# Patient Record
Sex: FEMALE | Employment: FULL TIME | URBAN - METROPOLITAN AREA
[De-identification: names, ages, dates, MRNs, and addresses within clinical notes are randomized per-mention and may not be internally consistent; named-entity substitution may affect disease eponyms.]

---

## 2024-03-13 ENCOUNTER — HOSPITAL ENCOUNTER (OUTPATIENT)
Facility: MEDICAL CENTER | Age: 49
End: 2024-03-13
Attending: OBSTETRICS & GYNECOLOGY
Payer: COMMERCIAL

## 2024-03-13 ENCOUNTER — GYNECOLOGY VISIT (OUTPATIENT)
Dept: OBGYN | Facility: CLINIC | Age: 49
End: 2024-03-13
Payer: COMMERCIAL

## 2024-03-13 VITALS — HEIGHT: 61 IN | BODY MASS INDEX: 27.15 KG/M2 | WEIGHT: 143.8 LBS

## 2024-03-13 DIAGNOSIS — R10.2 PELVIC PAIN: ICD-10-CM

## 2024-03-13 DIAGNOSIS — D25.9 UTERINE LEIOMYOMA, UNSPECIFIED LOCATION: ICD-10-CM

## 2024-03-13 DIAGNOSIS — Z01.419 WOMEN'S ANNUAL ROUTINE GYNECOLOGICAL EXAMINATION: ICD-10-CM

## 2024-03-13 DIAGNOSIS — Z12.39 ENCOUNTER FOR SCREENING FOR MALIGNANT NEOPLASM OF BREAST, UNSPECIFIED SCREENING MODALITY: ICD-10-CM

## 2024-03-13 DIAGNOSIS — Z12.11 SCREENING FOR COLON CANCER: ICD-10-CM

## 2024-03-13 DIAGNOSIS — Z12.4 SCREENING FOR CERVICAL CANCER: ICD-10-CM

## 2024-03-13 LAB — AMBIGUOUS DTTM AMBI4: NORMAL

## 2024-03-13 PROCEDURE — 87510 GARDNER VAG DNA DIR PROBE: CPT

## 2024-03-13 PROCEDURE — 87480 CANDIDA DNA DIR PROBE: CPT

## 2024-03-13 PROCEDURE — 87591 N.GONORRHOEAE DNA AMP PROB: CPT

## 2024-03-13 PROCEDURE — 88175 CYTOPATH C/V AUTO FLUID REDO: CPT

## 2024-03-13 PROCEDURE — 87491 CHLMYD TRACH DNA AMP PROBE: CPT

## 2024-03-13 PROCEDURE — 87660 TRICHOMONAS VAGIN DIR PROBE: CPT

## 2024-03-13 PROCEDURE — 87624 HPV HI-RISK TYP POOLED RSLT: CPT

## 2024-03-13 PROCEDURE — 99386 PREV VISIT NEW AGE 40-64: CPT | Performed by: OBSTETRICS & GYNECOLOGY

## 2024-03-13 NOTE — PROGRESS NOTES
ANNUAL GYNECOLOGY VISIT    Chief Complaint  No chief complaint on file.      Subjective  Camila Hills is a 48 y.o. female who presents today for Annual Exam.  She also would like to discuss her fibroid uterus. She had imaging at [facility] and I do not have the results available. She reports that her main symptom is pelvic pain. She states that her cycle has gotten heavier in the last year, but she does not feel like it is excessively heavy. She states that the pain and cramping sometimes interferes with her ability to do her day to day activities.     Preventive Care   There is no immunization history for the selected administration types on file for this patient.  Last Mammogram: 1.5 years ago per patient    Gynecology History and ROS  Current Sexual Activity: Yes  History of sexually transmitted diseases?  no  Abnormal discharge? No  Current Contraception:  None    Menstrual History  No LMP recorded.  Periods are regular  q 30 days, lasting 7 days.   Clots or heavy flow: Yes  Dysmenorrhea: Yes  Intermenstrual bleeding/spotting: No  Significant pain with periods:Yes  Bothersome PMS symptoms: No  Significant Pelvic Pain: Yes    Pap History  Last pap smear: She thinks she had one last year at a PCP. Records are not available   History of moderate or severe dysplasia: No    Cancer Risk Assessement:  Family history of:   - Breast cancer: no   - Ovarian cancer: no   - Uterine cancer: no   - Colon cancer: no    Obstetric History  OB History    Para Term  AB Living   2 2 2     2   SAB IAB Ectopic Molar Multiple Live Births             2      # Outcome Date GA Lbr Black/2nd Weight Sex Delivery Anes PTL Lv   2 Term 00    M Vag-Spont   SHUN   1 Term 10/02/95    F Vag-Spont   SHUN       Past Medical History  Past Medical History:   Diagnosis Date    Anemia     Blood transfusion without reported diagnosis        Past Surgical History  History reviewed. No pertinent surgical  history.    Social History  Social History     Socioeconomic History    Marital status:      Spouse name: Not on file    Number of children: Not on file    Years of education: Not on file    Highest education level: Not on file   Occupational History    Not on file   Tobacco Use    Smoking status: Never    Smokeless tobacco: Never   Vaping Use    Vaping Use: Never used   Substance and Sexual Activity    Alcohol use: Yes     Comment: occ    Drug use: Never    Sexual activity: Yes     Partners: Male   Other Topics Concern    Not on file   Social History Narrative    Not on file     Social Determinants of Health     Financial Resource Strain: Not on file   Food Insecurity: Not on file   Transportation Needs: Not on file   Physical Activity: Not on file   Stress: Not on file   Social Connections: Not on file   Intimate Partner Violence: Not on file   Housing Stability: Not on file       Family History  Family History   Problem Relation Age of Onset    Stroke Brother         Youngest brother       Home Medications  Current Outpatient Medications on File Prior to Visit   Medication Sig Dispense Refill    Chlorphen-PE-Acetaminophen (ROBITUSSIN COLD/CONGESTION PO) Take  by mouth. (Patient not taking: Reported on 3/13/2024)      promethazine-codeine (PHENERGAN-CODEINE) 6.25-10 MG/5ML SYRP Take 5-10 mL by mouth 4 times a day as needed for Cough. (Patient not taking: Reported on 3/13/2024) 150 mL 0    azithromycin (ZITHROMAX) 250 MG TABS Take  by mouth every day. 2 tabs by mouth day 1, 1 tab by mouth days 2-5 (Patient not taking: Reported on 3/13/2024) 1 Each 1     No current facility-administered medications on file prior to visit.       Allergies/Reactions  No Known Allergies    ROS  Positive ROS: pelvic pain with cycle  Gen: no fevers or chills, no significant weight loss or gain, excessive fatigue  Respiratory:  no cough or dyspnea  Cardiac:  no chest pain, no palpitations, no syncope  Breast: no breast discharge,  "pain, lump or skin changes  GI:  no heartburn, no abdominal pain, no nausea or vomiting  Urinary: no dysuria, urgency, frequency, incontinence   Psych: no depression or anxiety  Neuro: no migraines with aura, fainting spells, numbness or tingling  Extremities: no joint pain, persistently swollen ankles, recurrent leg cramps      Physical Examination:  Vital Signs:   Vitals:    03/13/24 0835   Weight: 143 lb 12.8 oz   Height: 5' 1\"     Body mass index is 27.17 kg/m².    Constitutional: The patient is well developed and well nourished.  Psychiatric: Patient is oriented to time place and person.   Skin: No rash observed.  Neck: Appears symmetric. Thyroid normal size  Respiratory: normal effort  Breast: Inspection reveals no asymetry or nipple discharge, no skin thickening, dimpling or erythema.  Palpation demonstrates no masses. She does have some areas that are denser than others and may be scar tissue from breast augmentation. Implants in place  Abdomen: Soft, non-tender.   Pelvic Exam:     Vulva: external female genitalia are normal in appearance. No lesions    Urethra - no lesions, no erythema    Vagina: moist, pink, normal ruggae    Cervix: pink, smooth, no lesions, no CMT    Uterus - non-tender, enlarged size (12cm), shape, contour, mobile, anteverted    Ovaries: non-tender, no appreciable masses  Pap Smear performed: Yes  Extremeties: Legs are symmetric and without tenderness. There is no edema present.    Labs/Imaging:  No results found for: \"HDL\", \"LDL\"  No components found for: \"A1C1\"  No results found for: \"WBC\", \"HGB\", \"HCT\"  No results found for: \"NA\", \"K\", \"CL\", \"CO2\", \"BUN\"    Assessment & Plan  Camila Hills is a 48 y.o. female who presents today for Annual Gyn Exam.     1. Women's annual routine gynecological examination  - Pap smear performed  - Referred for mammogram and colonoscopy     2. Screening for cervical cancer  - PAP+HPV+CT/NG [[id number]]    3. Encounter for screening for malignant " neoplasm of breast, unspecified screening modality  - MA-SCREENING MAMMO BILAT W/TOMOSYNTHESIS W/CAD; Future    4. Screening for colon cancer  - Referral to GI for Colonoscopy    5. Uterine leiomyoma, unspecified location  - Imaging was done through [facility]  - She signed a record release today  - We discussed some options: scheduled motrin with menses, TXA, menstrual suppression with hormonal contraception, surgery  - She is going to schedule motrin this month and return for follow up in one month       Return: 1 month     Vega Cox M.D.

## 2024-03-13 NOTE — PROGRESS NOTES
Patient here for GYN exam. New Pt  LMP= 3/6/24  Last pap: 10/023-WNL  Last mammogram if applies: year ago   Phone number:[phone] (home)   Pharmacy verified

## 2024-03-14 LAB
CANDIDA DNA VAG QL PROBE+SIG AMP: NEGATIVE
G VAGINALIS DNA VAG QL PROBE+SIG AMP: NEGATIVE
T VAGINALIS DNA VAG QL PROBE+SIG AMP: NEGATIVE

## 2024-03-20 LAB
C TRACH RRNA CVX QL NAA+PROBE: NEGATIVE
CYTOLOGIST CVX/VAG CYTO: NORMAL
CYTOLOGY CVX/VAG DOC CYTO: NORMAL
CYTOLOGY CVX/VAG DOC THIN PREP: NORMAL
HPV I/H RISK 4 DNA CVX QL PROBE+SIG AMP: NEGATIVE
N GONORRHOEA RRNA CVX QL NAA+PROBE: NEGATIVE
NOTE NL11727A: NORMAL
OTHER STN SPEC: NORMAL
STAT OF ADQ CVX/VAG CYTO-IMP: NORMAL

## 2024-03-29 ENCOUNTER — HOSPITAL ENCOUNTER (OUTPATIENT)
Dept: RADIOLOGY | Facility: MEDICAL CENTER | Age: 49
End: 2024-03-29

## 2024-04-23 ENCOUNTER — APPOINTMENT (OUTPATIENT)
Dept: OBGYN | Facility: CLINIC | Age: 49
End: 2024-04-23
Payer: COMMERCIAL

## 2024-06-13 ENCOUNTER — GYNECOLOGY VISIT (OUTPATIENT)
Dept: OBGYN | Facility: CLINIC | Age: 49
End: 2024-06-13
Payer: COMMERCIAL

## 2024-06-13 VITALS — SYSTOLIC BLOOD PRESSURE: 123 MMHG | BODY MASS INDEX: 25.89 KG/M2 | DIASTOLIC BLOOD PRESSURE: 78 MMHG | WEIGHT: 137 LBS

## 2024-06-13 DIAGNOSIS — N94.6 DYSMENORRHEA: ICD-10-CM

## 2024-06-13 DIAGNOSIS — N92.0 MENORRHAGIA WITH REGULAR CYCLE: ICD-10-CM

## 2024-06-13 PROCEDURE — 99212 OFFICE O/P EST SF 10 MIN: CPT

## 2024-06-13 PROCEDURE — 3078F DIAST BP <80 MM HG: CPT

## 2024-06-13 PROCEDURE — 3074F SYST BP LT 130 MM HG: CPT

## 2024-06-13 RX ORDER — IBUPROFEN 800 MG/1
800 TABLET ORAL EVERY 8 HOURS PRN
Qty: 30 TABLET | Refills: 6 | Status: SHIPPED | OUTPATIENT
Start: 2024-06-13

## 2024-06-13 RX ORDER — IBUPROFEN 200 MG
200 TABLET ORAL EVERY 6 HOURS PRN
COMMUNITY

## 2024-06-13 NOTE — LETTER
June 13, 2024         Patient: Camila Hills   YOB: 1975   Date of Visit: 6/13/2024           To Whom it May Concern:    Camila Hills was seen in my clinic on 6/13/2024. She may return to work on 06/17/2024.    If you have any questions or concerns, please don't hesitate to call.        Sincerely,           JEY ZapataRJhonN.  Electronically Signed

## 2024-06-13 NOTE — PROGRESS NOTES
Patient here for GYN exam.  C/o: go over ultrasound results   Last pap: 03/13/2024  LMP:05/22/2024  Last mammogram if applies: 03/29/2024  Phone number: [phone]   Pharmacy verified

## 2024-06-13 NOTE — PROGRESS NOTES
HPI Comments:  Camila Hills is a 49 y.o. y.o. female who presents for problem gyn visit: She would like to go over results from recent pelvic US. She has been experiencing painful periods for the past year that have gotten somewhat heavier. She reports periods are regular, monthly and last 7 days. Her heaviest, most painful flow days are days 1-3. She does not have to use super absorbency products but does have to chance her regular absorbency products almost hourly days 1-3. Patient's last menstrual period was 05/22/2024 (exact date).    .  Review of Systems :  Pertinent positives documented in HPI and all other systems reviewed & are negative    All PMH, PSH, allergies, social history and FH reviewed and updated today:  Past Medical History:   Diagnosis Date    Anemia     Blood transfusion without reported diagnosis      No past surgical history on file.  Patient has no known allergies.  Social History     Socioeconomic History    Marital status:    Tobacco Use    Smoking status: Never    Smokeless tobacco: Never   Vaping Use    Vaping status: Never Used   Substance and Sexual Activity    Alcohol use: Yes     Comment: occ    Drug use: Never    Sexual activity: Yes     Partners: Male     Family History   Problem Relation Age of Onset    Stroke Brother         Youngest brother     Medications:   Current Outpatient Medications Ordered in Epic   Medication Sig Dispense Refill    ibuprofen (MOTRIN) 200 MG Tab Take 200 mg by mouth every 6 hours as needed.      ibuprofen (MOTRIN) 800 MG Tab Take 1 Tablet by mouth every 8 hours as needed for Moderate Pain (Take 1 day before and for first three days of menses) for up to 210 doses. Indications: Excessive Amount of Menstrual Volume 30 Tablet 6    Chlorphen-PE-Acetaminophen (ROBITUSSIN COLD/CONGESTION PO) Take  by mouth. (Patient not taking: Reported on 3/13/2024)      promethazine-codeine (PHENERGAN-CODEINE) 6.25-10 MG/5ML SYRP Take 5-10 mL by mouth 4 times  a day as needed for Cough. (Patient not taking: Reported on 3/13/2024) 150 mL 0    azithromycin (ZITHROMAX) 250 MG TABS Take  by mouth every day. 2 tabs by mouth day 1, 1 tab by mouth days 2-5 (Patient not taking: Reported on 3/13/2024) 1 Each 1     No current [facility]-ordered facility-administered medications on file.          Objective:   Vital measurements:  /78 (BP Location: Left arm, Patient Position: Sitting, BP Cuff Size: Adult)   Wt 137 lb   Body mass index is 25.89 kg/m². (Goal BM I>18 <25)    Physical Exam   Nursing note and vitals reviewed.  Constitutional: She is oriented to person, place, and time. She appears well-developed and well-nourished. No distress.     Respiratory: normal effort     Neurological: She is alert and oriented to person, place, and time. She exhibits normal muscle tone.     Skin: Skin is warm and dry. No rash noted. She is not diaphoretic. No erythema. No pallor.     Psychiatric: She has a normal mood and affect. Her behavior is normal. Judgment and thought content normal.     Pelvic US  Done on 2/8/2024 - shows an enlarged uterus measuring 11.8 x 7.9 x 5.5cm with a normal endometrial stripe at 0.7cm.   There is a posterior intramural fibroid measuring 3.1 x 2.8cm, two lower uterine intramural fibroids measuring 1.9 and  2.2cm in diameter. An intramural fibroid  on the left body of the uterus measuring 3.3cm. Two subserosal fibroids on the posterior body of the uterus measuring 2.7 and 2.3cm and another on the right posterior body of the uterus measuring 2.5cm.   The right ovary showed a mildly complex cyst measuring 2.6cm with no solid components and internal daughter cyst.   The right ovary was not clearly visualized, however there were no left adnexal masses.      Assessment:     1. Menorrhagia with regular cycle        2. Dysmenorrhea          Plan:   Pt prefers to manage symptoms with scheduled ibuprofen rather than hormonal contraception or surgery. She will trial over the  next 2-3 months and FU if unsatisfied or desiring other intervention. Prescription sent.   Monthly SBE.  Counseling: breast self exam and mammography screening  Encourage exercise and proper diet.  See medications and orders placed in encounter report.  FU PRN if symptoms worsen or fail to improve, 1 year for annual visit     Return if symptoms worsen or fail to improve.

## 2024-10-30 ENCOUNTER — OFFICE VISIT (OUTPATIENT)
Dept: OBGYN | Facility: CLINIC | Age: 49
End: 2024-10-30
Payer: COMMERCIAL

## 2024-10-30 VITALS
HEIGHT: 62 IN | DIASTOLIC BLOOD PRESSURE: 92 MMHG | BODY MASS INDEX: 25.49 KG/M2 | SYSTOLIC BLOOD PRESSURE: 154 MMHG | WEIGHT: 138.5 LBS

## 2024-10-30 DIAGNOSIS — R03.0 ELEVATED BLOOD PRESSURE READING: ICD-10-CM

## 2024-10-30 DIAGNOSIS — D25.9 UTERINE LEIOMYOMA, UNSPECIFIED LOCATION: ICD-10-CM

## 2024-10-30 DIAGNOSIS — R10.2 PELVIC PAIN: ICD-10-CM

## 2024-10-30 ASSESSMENT — ENCOUNTER SYMPTOMS
EYES NEGATIVE: 1
RESPIRATORY NEGATIVE: 1
PSYCHIATRIC NEGATIVE: 1
GASTROINTESTINAL NEGATIVE: 1
MUSCULOSKELETAL NEGATIVE: 1
CONSTITUTIONAL NEGATIVE: 1
NEUROLOGICAL NEGATIVE: 1
CARDIOVASCULAR NEGATIVE: 1

## 2024-11-24 ENCOUNTER — APPOINTMENT (OUTPATIENT)
Dept: RADIOLOGY | Facility: MEDICAL CENTER | Age: 49
End: 2024-11-24
Attending: EMERGENCY MEDICINE
Payer: COMMERCIAL

## 2024-11-24 ENCOUNTER — HOSPITAL ENCOUNTER (INPATIENT)
Facility: MEDICAL CENTER | Age: 49
LOS: 1 days | End: 2024-11-25
Attending: EMERGENCY MEDICINE | Admitting: INTERNAL MEDICINE
Payer: COMMERCIAL

## 2024-11-24 ENCOUNTER — APPOINTMENT (OUTPATIENT)
Dept: RADIOLOGY | Facility: MEDICAL CENTER | Age: 49
End: 2024-11-24
Attending: INTERNAL MEDICINE
Payer: COMMERCIAL

## 2024-11-24 DIAGNOSIS — I82.422 ILIAC VEIN THROMBOSIS, LEFT (HCC): ICD-10-CM

## 2024-11-24 DIAGNOSIS — R19.00 PELVIC MASS: ICD-10-CM

## 2024-11-24 DIAGNOSIS — I82.412 FEMORAL VEIN THROMBOSIS, LEFT (HCC): ICD-10-CM

## 2024-11-24 DIAGNOSIS — N13.9 ACUTE BILATERAL OBSTRUCTIVE UROPATHY: ICD-10-CM

## 2024-11-24 PROBLEM — I82.413 ACUTE DEEP VEIN THROMBOSIS (DVT) OF FEMORAL VEIN OF BOTH LOWER EXTREMITIES (HCC): Status: ACTIVE | Noted: 2024-11-24

## 2024-11-24 PROBLEM — D50.9 MICROCYTIC ANEMIA: Status: ACTIVE | Noted: 2024-11-24

## 2024-11-24 PROBLEM — Z71.89 ADVANCED CARE PLANNING/COUNSELING DISCUSSION: Status: ACTIVE | Noted: 2024-11-24

## 2024-11-24 LAB
ALBUMIN SERPL BCP-MCNC: 4.5 G/DL (ref 3.2–4.9)
ALBUMIN/GLOB SERPL: 1.5 G/DL
ALP SERPL-CCNC: 109 U/L (ref 30–99)
ALT SERPL-CCNC: 16 U/L (ref 2–50)
ANION GAP SERPL CALC-SCNC: 13 MMOL/L (ref 7–16)
ANISOCYTOSIS BLD QL SMEAR: ABNORMAL
APPEARANCE UR: CLEAR
APTT PPP: 27.2 SEC (ref 24.7–36)
AST SERPL-CCNC: 23 U/L (ref 12–45)
BASOPHILS # BLD AUTO: 0.4 % (ref 0–1.8)
BASOPHILS # BLD: 0.04 K/UL (ref 0–0.12)
BILIRUB SERPL-MCNC: 0.6 MG/DL (ref 0.1–1.5)
BILIRUB UR QL STRIP.AUTO: NEGATIVE
BUN SERPL-MCNC: 13 MG/DL (ref 8–22)
CALCIUM ALBUM COR SERPL-MCNC: 8.9 MG/DL (ref 8.5–10.5)
CALCIUM SERPL-MCNC: 9.3 MG/DL (ref 8.5–10.5)
CANCER AG125 SERPL-ACNC: 32.9 U/ML (ref 0–35)
CANCER AG19-9 SERPL-ACNC: 6.6 U/ML (ref 0–35)
CEA SERPL-MCNC: 1.7 NG/ML (ref 0–3)
CHLORIDE SERPL-SCNC: 104 MMOL/L (ref 96–112)
CO2 SERPL-SCNC: 23 MMOL/L (ref 20–33)
COLOR UR: YELLOW
COMMENT 1642: NORMAL
CREAT SERPL-MCNC: 0.64 MG/DL (ref 0.5–1.4)
EOSINOPHIL # BLD AUTO: 0.07 K/UL (ref 0–0.51)
EOSINOPHIL NFR BLD: 0.7 % (ref 0–6.9)
ERYTHROCYTE [DISTWIDTH] IN BLOOD BY AUTOMATED COUNT: 44.6 FL (ref 35.9–50)
FERRITIN SERPL-MCNC: 40.1 NG/ML (ref 10–291)
GFR SERPLBLD CREATININE-BSD FMLA CKD-EPI: 109 ML/MIN/1.73 M 2
GLOBULIN SER CALC-MCNC: 3 G/DL (ref 1.9–3.5)
GLUCOSE SERPL-MCNC: 96 MG/DL (ref 65–99)
GLUCOSE UR STRIP.AUTO-MCNC: NEGATIVE MG/DL
HCG SERPL QL: NEGATIVE
HCT VFR BLD AUTO: 32.2 % (ref 37–47)
HGB BLD-MCNC: 9.6 G/DL (ref 12–16)
HGB RETIC QN AUTO: 22.5 PG/CELL (ref 29–35)
HYPOCHROMIA BLD QL SMEAR: ABNORMAL
IMM GRANULOCYTES # BLD AUTO: 0.04 K/UL (ref 0–0.11)
IMM GRANULOCYTES NFR BLD AUTO: 0.4 % (ref 0–0.9)
IMM RETICS NFR: 25.3 % (ref 2.6–16.1)
INR PPP: 1.08 (ref 0.87–1.13)
IRON SATN MFR SERPL: 7 % (ref 15–55)
IRON SERPL-MCNC: 31 UG/DL (ref 40–170)
KETONES UR STRIP.AUTO-MCNC: 80 MG/DL
LDH SERPL L TO P-CCNC: 409 U/L (ref 107–266)
LEUKOCYTE ESTERASE UR QL STRIP.AUTO: NEGATIVE
LIPASE SERPL-CCNC: 23 U/L (ref 11–82)
LYMPHOCYTES # BLD AUTO: 1.58 K/UL (ref 1–4.8)
LYMPHOCYTES NFR BLD: 16.2 % (ref 22–41)
MCH RBC QN AUTO: 22.1 PG (ref 27–33)
MCHC RBC AUTO-ENTMCNC: 29.8 G/DL (ref 32.2–35.5)
MCV RBC AUTO: 74.2 FL (ref 81.4–97.8)
MICRO URNS: ABNORMAL
MICROCYTES BLD QL SMEAR: ABNORMAL
MONOCYTES # BLD AUTO: 0.63 K/UL (ref 0–0.85)
MONOCYTES NFR BLD AUTO: 6.4 % (ref 0–13.4)
MORPHOLOGY BLD-IMP: NORMAL
NEUTROPHILS # BLD AUTO: 7.41 K/UL (ref 1.82–7.42)
NEUTROPHILS NFR BLD: 75.9 % (ref 44–72)
NITRITE UR QL STRIP.AUTO: NEGATIVE
NRBC # BLD AUTO: 0 K/UL
NRBC BLD-RTO: 0 /100 WBC (ref 0–0.2)
OVALOCYTES BLD QL SMEAR: NORMAL
PH UR STRIP.AUTO: 5 [PH] (ref 5–8)
PLATELET # BLD AUTO: 355 K/UL (ref 164–446)
PLATELET BLD QL SMEAR: NORMAL
PMV BLD AUTO: 9 FL (ref 9–12.9)
POIKILOCYTOSIS BLD QL SMEAR: NORMAL
POTASSIUM SERPL-SCNC: 3.7 MMOL/L (ref 3.6–5.5)
PROT SERPL-MCNC: 7.5 G/DL (ref 6–8.2)
PROT UR QL STRIP: NEGATIVE MG/DL
PROTHROMBIN TIME: 14 SEC (ref 12–14.6)
RBC # BLD AUTO: 4.34 M/UL (ref 4.2–5.4)
RBC BLD AUTO: PRESENT
RBC UR QL AUTO: NEGATIVE
RETICS # AUTO: 0.06 M/UL (ref 0.04–0.12)
RETICS/RBC NFR: 1.4 % (ref 0.8–2.6)
SODIUM SERPL-SCNC: 140 MMOL/L (ref 135–145)
SP GR UR STRIP.AUTO: >1.045
TIBC SERPL-MCNC: 468 UG/DL (ref 250–450)
TRANSFERRIN SERPL-MCNC: 329 MG/DL (ref 200–370)
UFH PPP CHRO-ACNC: <0.1 IU/ML
UIBC SERPL-MCNC: 437 UG/DL (ref 110–370)
UROBILINOGEN UR STRIP.AUTO-MCNC: 1 EU/DL
WBC # BLD AUTO: 9.8 K/UL (ref 4.8–10.8)

## 2024-11-24 PROCEDURE — 84466 ASSAY OF TRANSFERRIN: CPT

## 2024-11-24 PROCEDURE — 96374 THER/PROPH/DIAG INJ IV PUSH: CPT

## 2024-11-24 PROCEDURE — 85046 RETICYTE/HGB CONCENTRATE: CPT

## 2024-11-24 PROCEDURE — 83550 IRON BINDING TEST: CPT

## 2024-11-24 PROCEDURE — 74177 CT ABD & PELVIS W/CONTRAST: CPT

## 2024-11-24 PROCEDURE — 85025 COMPLETE CBC W/AUTO DIFF WBC: CPT

## 2024-11-24 PROCEDURE — 86304 IMMUNOASSAY TUMOR CA 125: CPT

## 2024-11-24 PROCEDURE — 84703 CHORIONIC GONADOTROPIN ASSAY: CPT

## 2024-11-24 PROCEDURE — 83520 IMMUNOASSAY QUANT NOS NONAB: CPT

## 2024-11-24 PROCEDURE — A9270 NON-COVERED ITEM OR SERVICE: HCPCS | Performed by: INTERNAL MEDICINE

## 2024-11-24 PROCEDURE — 83540 ASSAY OF IRON: CPT

## 2024-11-24 PROCEDURE — 82378 CARCINOEMBRYONIC ANTIGEN: CPT

## 2024-11-24 PROCEDURE — 82728 ASSAY OF FERRITIN: CPT

## 2024-11-24 PROCEDURE — 700111 HCHG RX REV CODE 636 W/ 250 OVERRIDE (IP): Performed by: EMERGENCY MEDICINE

## 2024-11-24 PROCEDURE — 86336 INHIBIN A: CPT

## 2024-11-24 PROCEDURE — 83690 ASSAY OF LIPASE: CPT

## 2024-11-24 PROCEDURE — 86301 IMMUNOASSAY TUMOR CA 19-9: CPT

## 2024-11-24 PROCEDURE — 99291 CRITICAL CARE FIRST HOUR: CPT | Performed by: INTERNAL MEDICINE

## 2024-11-24 PROCEDURE — 85520 HEPARIN ASSAY: CPT

## 2024-11-24 PROCEDURE — 80053 COMPREHEN METABOLIC PANEL: CPT

## 2024-11-24 PROCEDURE — 82105 ALPHA-FETOPROTEIN SERUM: CPT

## 2024-11-24 PROCEDURE — 700117 HCHG RX CONTRAST REV CODE 255: Performed by: EMERGENCY MEDICINE

## 2024-11-24 PROCEDURE — 81003 URINALYSIS AUTO W/O SCOPE: CPT

## 2024-11-24 PROCEDURE — 770020 HCHG ROOM/CARE - TELE (206)

## 2024-11-24 PROCEDURE — 93970 EXTREMITY STUDY: CPT

## 2024-11-24 PROCEDURE — 83615 LACTATE (LD) (LDH) ENZYME: CPT

## 2024-11-24 PROCEDURE — 85730 THROMBOPLASTIN TIME PARTIAL: CPT

## 2024-11-24 PROCEDURE — 51798 US URINE CAPACITY MEASURE: CPT

## 2024-11-24 PROCEDURE — 85610 PROTHROMBIN TIME: CPT

## 2024-11-24 PROCEDURE — 93970 EXTREMITY STUDY: CPT | Mod: 26 | Performed by: INTERNAL MEDICINE

## 2024-11-24 PROCEDURE — 76830 TRANSVAGINAL US NON-OB: CPT

## 2024-11-24 PROCEDURE — 700102 HCHG RX REV CODE 250 W/ 637 OVERRIDE(OP): Performed by: INTERNAL MEDICINE

## 2024-11-24 PROCEDURE — 99222 1ST HOSP IP/OBS MODERATE 55: CPT | Performed by: OBSTETRICS & GYNECOLOGY

## 2024-11-24 RX ORDER — HEPARIN SODIUM 5000 [USP'U]/100ML
0-30 INJECTION, SOLUTION INTRAVENOUS CONTINUOUS
Status: DISCONTINUED | OUTPATIENT
Start: 2024-11-24 | End: 2024-11-24

## 2024-11-24 RX ORDER — PROMETHAZINE HYDROCHLORIDE 25 MG/1
12.5-25 SUPPOSITORY RECTAL EVERY 4 HOURS PRN
Status: DISCONTINUED | OUTPATIENT
Start: 2024-11-24 | End: 2024-11-25 | Stop reason: HOSPADM

## 2024-11-24 RX ORDER — POLYETHYLENE GLYCOL 3350 17 G/17G
1 POWDER, FOR SOLUTION ORAL
Status: DISCONTINUED | OUTPATIENT
Start: 2024-11-24 | End: 2024-11-25 | Stop reason: HOSPADM

## 2024-11-24 RX ORDER — PROMETHAZINE HYDROCHLORIDE 25 MG/1
12.5-25 TABLET ORAL EVERY 4 HOURS PRN
Status: DISCONTINUED | OUTPATIENT
Start: 2024-11-24 | End: 2024-11-25 | Stop reason: HOSPADM

## 2024-11-24 RX ORDER — HEPARIN SODIUM 1000 [USP'U]/ML
80 INJECTION, SOLUTION INTRAVENOUS; SUBCUTANEOUS ONCE
Status: COMPLETED | OUTPATIENT
Start: 2024-11-24 | End: 2024-11-24

## 2024-11-24 RX ORDER — PROCHLORPERAZINE EDISYLATE 5 MG/ML
5-10 INJECTION INTRAMUSCULAR; INTRAVENOUS EVERY 4 HOURS PRN
Status: DISCONTINUED | OUTPATIENT
Start: 2024-11-24 | End: 2024-11-25 | Stop reason: HOSPADM

## 2024-11-24 RX ORDER — ONDANSETRON 4 MG/1
4 TABLET, ORALLY DISINTEGRATING ORAL EVERY 4 HOURS PRN
Status: DISCONTINUED | OUTPATIENT
Start: 2024-11-24 | End: 2024-11-25 | Stop reason: HOSPADM

## 2024-11-24 RX ORDER — SIMETHICONE 125 MG
125 TABLET,CHEWABLE ORAL
COMMUNITY

## 2024-11-24 RX ORDER — OXYCODONE HYDROCHLORIDE 5 MG/1
5 TABLET ORAL
Status: DISCONTINUED | OUTPATIENT
Start: 2024-11-24 | End: 2024-11-25 | Stop reason: HOSPADM

## 2024-11-24 RX ORDER — AMOXICILLIN 250 MG
2 CAPSULE ORAL EVERY EVENING
Status: DISCONTINUED | OUTPATIENT
Start: 2024-11-24 | End: 2024-11-25 | Stop reason: HOSPADM

## 2024-11-24 RX ORDER — OXYCODONE HYDROCHLORIDE 10 MG/1
10 TABLET ORAL
Status: DISCONTINUED | OUTPATIENT
Start: 2024-11-24 | End: 2024-11-25 | Stop reason: HOSPADM

## 2024-11-24 RX ORDER — IBUPROFEN 800 MG/1
800 TABLET, FILM COATED ORAL EVERY 8 HOURS PRN
COMMUNITY

## 2024-11-24 RX ORDER — MECLIZINE HYDROCHLORIDE 25 MG/1
25 TABLET ORAL EVERY 6 HOURS PRN
Status: ON HOLD | COMMUNITY
Start: 2024-11-08 | End: 2024-12-20

## 2024-11-24 RX ORDER — ACETAMINOPHEN 325 MG/1
650 TABLET ORAL EVERY 6 HOURS PRN
Status: DISCONTINUED | OUTPATIENT
Start: 2024-11-24 | End: 2024-11-25 | Stop reason: HOSPADM

## 2024-11-24 RX ORDER — MORPHINE SULFATE 4 MG/ML
4 INJECTION INTRAVENOUS ONCE
Status: COMPLETED | OUTPATIENT
Start: 2024-11-24 | End: 2024-11-24

## 2024-11-24 RX ORDER — HYDROMORPHONE HYDROCHLORIDE 1 MG/ML
0.5 INJECTION, SOLUTION INTRAMUSCULAR; INTRAVENOUS; SUBCUTANEOUS
Status: DISCONTINUED | OUTPATIENT
Start: 2024-11-24 | End: 2024-11-25 | Stop reason: HOSPADM

## 2024-11-24 RX ORDER — LABETALOL HYDROCHLORIDE 5 MG/ML
10 INJECTION, SOLUTION INTRAVENOUS EVERY 4 HOURS PRN
Status: DISCONTINUED | OUTPATIENT
Start: 2024-11-24 | End: 2024-11-25 | Stop reason: HOSPADM

## 2024-11-24 RX ORDER — ONDANSETRON 2 MG/ML
4 INJECTION INTRAMUSCULAR; INTRAVENOUS EVERY 4 HOURS PRN
Status: DISCONTINUED | OUTPATIENT
Start: 2024-11-24 | End: 2024-11-25 | Stop reason: HOSPADM

## 2024-11-24 RX ORDER — HEPARIN SODIUM 1000 [USP'U]/ML
40 INJECTION, SOLUTION INTRAVENOUS; SUBCUTANEOUS PRN
Status: DISCONTINUED | OUTPATIENT
Start: 2024-11-24 | End: 2024-11-24

## 2024-11-24 RX ADMIN — IOHEXOL 100 ML: 350 INJECTION, SOLUTION INTRAVENOUS at 12:15

## 2024-11-24 RX ADMIN — HEPARIN SODIUM 18 UNITS/KG/HR: 5000 INJECTION, SOLUTION INTRAVENOUS at 14:55

## 2024-11-24 RX ADMIN — OXYCODONE 5 MG: 5 TABLET ORAL at 14:48

## 2024-11-24 RX ADMIN — HEPARIN SODIUM 4900 UNITS: 1000 INJECTION, SOLUTION INTRAVENOUS; SUBCUTANEOUS at 14:51

## 2024-11-24 RX ADMIN — MORPHINE SULFATE 4 MG: 4 INJECTION, SOLUTION INTRAMUSCULAR; INTRAVENOUS at 10:54

## 2024-11-24 SDOH — ECONOMIC STABILITY: TRANSPORTATION INSECURITY
IN THE PAST 12 MONTHS, HAS THE LACK OF TRANSPORTATION KEPT YOU FROM MEDICAL APPOINTMENTS OR FROM GETTING MEDICATIONS?: NO

## 2024-11-24 SDOH — ECONOMIC STABILITY: TRANSPORTATION INSECURITY
IN THE PAST 12 MONTHS, HAS LACK OF RELIABLE TRANSPORTATION KEPT YOU FROM MEDICAL APPOINTMENTS, MEETINGS, WORK OR FROM GETTING THINGS NEEDED FOR DAILY LIVING?: NO

## 2024-11-24 ASSESSMENT — SOCIAL DETERMINANTS OF HEALTH (SDOH)
WITHIN THE PAST 12 MONTHS, YOU WORRIED THAT YOUR FOOD WOULD RUN OUT BEFORE YOU GOT THE MONEY TO BUY MORE: NEVER TRUE
IN THE PAST 12 MONTHS, HAS THE ELECTRIC, GAS, OIL, OR WATER COMPANY THREATENED TO SHUT OFF SERVICE IN YOUR HOME?: NO
WITHIN THE LAST YEAR, HAVE YOU BEEN AFRAID OF YOUR PARTNER OR EX-PARTNER?: NO
WITHIN THE LAST YEAR, HAVE TO BEEN RAPED OR FORCED TO HAVE ANY KIND OF SEXUAL ACTIVITY BY YOUR PARTNER OR EX-PARTNER?: NO
WITHIN THE LAST YEAR, HAVE YOU BEEN HUMILIATED OR EMOTIONALLY ABUSED IN OTHER WAYS BY YOUR PARTNER OR EX-PARTNER?: NO
WITHIN THE PAST 12 MONTHS, THE FOOD YOU BOUGHT JUST DIDN'T LAST AND YOU DIDN'T HAVE MONEY TO GET MORE: NEVER TRUE
WITHIN THE LAST YEAR, HAVE YOU BEEN KICKED, HIT, SLAPPED, OR OTHERWISE PHYSICALLY HURT BY YOUR PARTNER OR EX-PARTNER?: NO

## 2024-11-24 ASSESSMENT — COGNITIVE AND FUNCTIONAL STATUS - GENERAL
SUGGESTED CMS G CODE MODIFIER MOBILITY: CH
MOBILITY SCORE: 24
SUGGESTED CMS G CODE MODIFIER DAILY ACTIVITY: CH
DAILY ACTIVITIY SCORE: 24

## 2024-11-24 ASSESSMENT — LIFESTYLE VARIABLES
HAVE PEOPLE ANNOYED YOU BY CRITICIZING YOUR DRINKING: NO
AVERAGE NUMBER OF DAYS PER WEEK YOU HAVE A DRINK CONTAINING ALCOHOL: 1
ON A TYPICAL DAY WHEN YOU DRINK ALCOHOL HOW MANY DRINKS DO YOU HAVE: 1
EVER HAD A DRINK FIRST THING IN THE MORNING TO STEADY YOUR NERVES TO GET RID OF A HANGOVER: NO
CONSUMPTION TOTAL: NEGATIVE
HAVE YOU EVER FELT YOU SHOULD CUT DOWN ON YOUR DRINKING: NO
TOTAL SCORE: 0
TOTAL SCORE: 0
HOW MANY TIMES IN THE PAST YEAR HAVE YOU HAD 5 OR MORE DRINKS IN A DAY: 0
ALCOHOL_USE: YES
EVER FELT BAD OR GUILTY ABOUT YOUR DRINKING: NO
TOTAL SCORE: 0

## 2024-11-24 ASSESSMENT — PATIENT HEALTH QUESTIONNAIRE - PHQ9
2. FEELING DOWN, DEPRESSED, IRRITABLE, OR HOPELESS: NOT AT ALL
1. LITTLE INTEREST OR PLEASURE IN DOING THINGS: NOT AT ALL
SUM OF ALL RESPONSES TO PHQ9 QUESTIONS 1 AND 2: 0

## 2024-11-24 ASSESSMENT — PAIN DESCRIPTION - PAIN TYPE
TYPE: ACUTE PAIN

## 2024-11-24 ASSESSMENT — ENCOUNTER SYMPTOMS: ABDOMINAL PAIN: 1

## 2024-11-24 ASSESSMENT — FIBROSIS 4 INDEX: FIB4 SCORE: 0.76

## 2024-11-24 NOTE — ED NOTES
Med rec completed per patient at bedside.    Allergies reviewed with patient.    Outpatient antibiotics within the last 30 days: NONE.    ANTICOAGULANTS: NONE.    Preferred [facility]: [facility].    *Patient was prescribed Myfembree (relugolix/estradiol/norethindrone) on 10/30/2024, however she states she never picked this medication up from the pharmacy because it was cost prohibitive. Myfembree removed from med rec at this time.

## 2024-11-24 NOTE — ED TRIAGE NOTES
"Chief Complaint   Patient presents with    Abdominal Pain   BP (!) 176/116   Pulse (!) 114   Temp 36.8 °C (98.2 °F) (Temporal)   Resp 16   Ht 1.575 m (5' 2\")   Wt 61 kg (134 lb 7.7 oz)   LMP 11/01/2024 (Exact Date)   SpO2 97%   BMI 24.60 kg/m²     Pt reports x2 days of lower abd pain, described as a \"stiff, twisting\" pain with radiation to the flanks.     Pt reports a hx of uterine fibroids with pain in a similar area, although today the pain feels different than usual.    Denies any hx of abd surgeries, blood in urine, or difficutly urinating.       Pt is AAOx4 GCS 15, able to speak in full sentences.      "

## 2024-11-24 NOTE — ED NOTES
Pt ambulatory to room with no assistance, placed in gown and on monitor. Chart up for ERP to see.

## 2024-11-24 NOTE — PROGRESS NOTES
4 Eyes Skin Assessment Completed by CAROLYN Fofana and DENAE Solo.    Head WDL  Ears WDL  Nose WDL  Mouth WDL  Neck WDL  Breast/Chest WDL  Shoulder Blades WDL  Spine WDL  (R) Arm/Elbow/Hand WDL  (L) Arm/Elbow/Hand WDL  Abdomen WDL  Groin WDL  Scrotum/Coccyx/Buttocks WDL  (R) Leg WDL  (L) Leg WDL  (R) Heel/Foot/Toe WDL  (L) Heel/Foot/Toe WDL          Devices In Places Tele Box and Pulse Ox      Interventions In Place N/A    Possible Skin Injury No    Pictures Uploaded Into Epic N/A  Wound Consult Placed N/A  RN Wound Prevention Protocol Ordered No

## 2024-11-24 NOTE — ED PROVIDER NOTES
"ED Provider Note    CHIEF COMPLAINT  Chief Complaint   Patient presents with    Abdominal Pain       EXTERNAL RECORDS REVIEWED  10/30/2024, outpatient GYN, pelvic pain and fibroids    HPI/ROS    Camila Hills is a 49 y.o. female who presents for evaluation of abdominal pain for the past 2 days.  Has a history of uterine fibroids and does get pain related that but states that pain is usually low in her pelvis.  Currently she is experiencing pain rating up higher in her abdomen and into her flanks.  She reports feeling distended as well.  Urinating normally.  No diarrhea, normal bowel movements.  No vomiting.  No fever.  No other concerning medical problems.  No abdominal surgeries.    PAST MEDICAL HISTORY   has a past medical history of Anemia and Blood transfusion without reported diagnosis.    SURGICAL HISTORY  patient denies any surgical history    FAMILY HISTORY  Family History   Problem Relation Age of Onset    Stroke Brother         Youngest brother       SOCIAL HISTORY  Social History     Tobacco Use    Smoking status: Never    Smokeless tobacco: Never   Vaping Use    Vaping status: Never Used   Substance and Sexual Activity    Alcohol use: Yes     Comment: occ    Drug use: Never    Sexual activity: Yes     Partners: Male       CURRENT MEDICATIONS  Home Medications       Reviewed by Mk Marie R.N. (Registered Nurse) on 11/24/24 at 1016  Med List Status: Partial     Medication Last Dose Status   azithromycin (ZITHROMAX) 250 MG TABS  Active   Chlorphen-PE-Acetaminophen (ROBITUSSIN COLD/CONGESTION PO)  Active   ibuprofen (MOTRIN) 200 MG Tab  Active   ibuprofen (MOTRIN) 800 MG Tab  Active   promethazine-codeine (PHENERGAN-CODEINE) 6.25-10 MG/5ML SYRP  Active   Relugolix-Estradiol-Norethind 40-1-0.5 MG Tab  Active                    ALLERGIES  No Known Allergies    PHYSICAL EXAM  VITAL SIGNS: BP (!) 176/116   Pulse (!) 114   Temp 36.8 °C (98.2 °F) (Temporal)   Resp 16   Ht 1.575 m (5' 2\")   Wt " 61 kg (134 lb 7.7 oz)   LMP 11/01/2024 (Exact Date)   SpO2 97%   BMI 24.60 kg/m²    Constitutional: Appears very uncomfortable  HENT: Normocephalic, no obvious evidence of acute trauma.  Eyes: No scleral icterus. Normal conjunctiva   Thorax & Lungs: Normal nonlabored respirations. I appreciate no wheezing, rhonchi or rales. There is normal air movement.  Upon cardiac ascultation I appreciate a regular heart rhythm and a normal rate.   Abdomen: The abdomen seems distended particularly the lower half of the abdomen.  Very firm on palpation and tender essentially from the umbilicus inferiorly.  Skin: The exposed portions of skin reveal no obvious rash or other abnormalities.  Extremities/Musculoskeletal: No obvious sign of acute trauma. No asymmetric calf tenderness or edema.   Neurologic: Alert & oriented. No focal deficits observed.      EKG/LABS  Results for orders placed or performed during the hospital encounter of 11/24/24   CBC WITH DIFFERENTIAL    Collection Time: 11/24/24 10:30 AM   Result Value Ref Range    WBC 9.8 4.8 - 10.8 K/uL    RBC 4.34 4.20 - 5.40 M/uL    Hemoglobin 9.6 (L) 12.0 - 16.0 g/dL    Hematocrit 32.2 (L) 37.0 - 47.0 %    MCV 74.2 (L) 81.4 - 97.8 fL    MCH 22.1 (L) 27.0 - 33.0 pg    MCHC 29.8 (L) 32.2 - 35.5 g/dL    RDW 44.6 35.9 - 50.0 fL    Platelet Count 355 164 - 446 K/uL    MPV 9.0 9.0 - 12.9 fL    Neutrophils-Polys 75.90 (H) 44.00 - 72.00 %    Lymphocytes 16.20 (L) 22.00 - 41.00 %    Monocytes 6.40 0.00 - 13.40 %    Eosinophils 0.70 0.00 - 6.90 %    Basophils 0.40 0.00 - 1.80 %    Immature Granulocytes 0.40 0.00 - 0.90 %    Nucleated RBC 0.00 0.00 - 0.20 /100 WBC    Neutrophils (Absolute) 7.41 1.82 - 7.42 K/uL    Lymphs (Absolute) 1.58 1.00 - 4.80 K/uL    Monos (Absolute) 0.63 0.00 - 0.85 K/uL    Eos (Absolute) 0.07 0.00 - 0.51 K/uL    Baso (Absolute) 0.04 0.00 - 0.12 K/uL    Immature Granulocytes (abs) 0.04 0.00 - 0.11 K/uL    NRBC (Absolute) 0.00 K/uL    Hypochromia 1+      Anisocytosis 2+ (A)     Microcytosis 2+ (A)    COMP METABOLIC PANEL    Collection Time: 11/24/24 10:30 AM   Result Value Ref Range    Sodium 140 135 - 145 mmol/L    Potassium 3.7 3.6 - 5.5 mmol/L    Chloride 104 96 - 112 mmol/L    Co2 23 20 - 33 mmol/L    Anion Gap 13.0 7.0 - 16.0    Glucose 96 65 - 99 mg/dL    Bun 13 8 - 22 mg/dL    Creatinine 0.64 0.50 - 1.40 mg/dL    Calcium 9.3 8.5 - 10.5 mg/dL    Correct Calcium 8.9 8.5 - 10.5 mg/dL    AST(SGOT) 23 12 - 45 U/L    ALT(SGPT) 16 2 - 50 U/L    Alkaline Phosphatase 109 (H) 30 - 99 U/L    Total Bilirubin 0.6 0.1 - 1.5 mg/dL    Albumin 4.5 3.2 - 4.9 g/dL    Total Protein 7.5 6.0 - 8.2 g/dL    Globulin 3.0 1.9 - 3.5 g/dL    A-G Ratio 1.5 g/dL   LIPASE    Collection Time: 11/24/24 10:30 AM   Result Value Ref Range    Lipase 23 11 - 82 U/L   HCG QUAL SERUM    Collection Time: 11/24/24 10:30 AM   Result Value Ref Range    Beta-Hcg Qualitative Serum Negative Negative   ESTIMATED GFR    Collection Time: 11/24/24 10:30 AM   Result Value Ref Range    GFR (CKD-EPI) 109 >60 mL/min/1.73 m 2   PLATELET ESTIMATE    Collection Time: 11/24/24 10:30 AM   Result Value Ref Range    Plt Estimation Normal    MORPHOLOGY    Collection Time: 11/24/24 10:30 AM   Result Value Ref Range    RBC Morphology Present     Poikilocytosis 1+     Ovalocytes 1+    PERIPHERAL SMEAR REVIEW    Collection Time: 11/24/24 10:30 AM   Result Value Ref Range    Peripheral Smear Review see below    DIFFERENTIAL COMMENT    Collection Time: 11/24/24 10:30 AM   Result Value Ref Range    Comments-Diff see below    aPTT    Collection Time: 11/24/24  1:19 PM   Result Value Ref Range    APTT 27.2 24.7 - 36.0 sec   Prothrombin Time    Collection Time: 11/24/24  1:19 PM   Result Value Ref Range    PT 14.0 12.0 - 14.6 sec    INR 1.08 0.87 - 1.13   Heparin Xa (Unfractionated)    Collection Time: 11/24/24  1:19 PM   Result Value Ref Range    Heparin Xa (UFH) <0.10 IU/mL      I have independently interpreted this  EKG    RADIOLOGY/PROCEDURES   I have independently interpreted the diagnostic imaging associated with this visit and am waiting the final reading from the radiologist.   My preliminary interpretation is as follows: Large pelvic mass    Radiologist interpretation:  US-EXTREMITY VENOUS LOWER BILAT   Final Result      CT-ABDOMEN-PELVIS WITH   Final Result      1.  Large heterogeneous pelvic mass with fluid density and solid components which extends into the lower abdomen and measures 20 x 18 x 10 cm in size. This is likely either ovarian or uterine in origin.      2.  Mild right and moderate left hydronephrosis secondary to compression by the mass. There is hypoenhancement of the left kidney consistent with significant obstruction.      3.  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      4.  A voalte message was sent to CORBY ARELLANO at 1205 PM on 11/24/2024.      US-PELVIC COMPLETE (TRANSABDOMINAL/TRANSVAGINAL) (COMBO)    (Results Pending)       COURSE & MEDICAL DECISION MAKING    ASSESSMENT, COURSE AND PLAN  Care Narrative: 49-year-old female with 2 days of abdominal pain, on exam she has infraumbilical tenderness and distention, she denies any difficulty urinating whatsoever.  She is very tender on exam.  She is tachycardic and appears quite uncomfortable.  History of uterine fibroids, that typically causes pain much lower for her and she states that she has never had this distention before.  Normal bowel movements.  No vomiting.  Blood work and CT will be obtained.  Morphine will be administered.  She will be reevaluated      Blood work reveals anemia with a hemoglobin 9.6.  Microcytic.  Normal WBC.  Electrolytes, GFR, LFTs and lipase are normal.  CT scan unfortunately reveals a large pelvic mass extending into her abdomen with associated bilateral hydronephrosis.  Also indication of left common femoral and external iliac vein  thrombosis.  The patient needs to be anticoagulated, I have chosen heparin given her history of anemia with blood transfusion related to uterine bleeding especially considering his large pelvic mass.  I discussed the case with Dr. Jorgensen, the on-call gynecologist who requested ultrasound, this will be performed.  We discussed urology versus Guynn unk management of the bilateral obstructive uropathy,  this will be pending complete GYN consultation.  For now the patient will be admitted to the hospital under the care of the hospitalist on a heparin infusion      CRITICAL CARE  The very real possibilty of a deterioration of this patient's condition required the highest level of my preparedness for sudden, emergent intervention.  This included medication management of intravenous heparin.  The critical care time associated with the care of the patient was 39 minutes. Review chart for interventions. This time is exclusive of any other billable procedures.        DISPOSITION AND DISCUSSIONS  I have discussed management of the patient with the following physicians and ABBE's: Dr. Jorgensen, gynecology.  , admitting hospitalist    FINAL DIAGNOSIS  1. Pelvic mass    2. Acute bilateral obstructive uropathy    3. Iliac vein thrombosis, left (HCC)    4. Femoral vein thrombosis, left (HCC)         Electronically signed by: Iam Dumas M.D., 11/24/2024 10:37 AM

## 2024-11-24 NOTE — H&P
Hospital Medicine History & Physical Note    Date of Service  11/24/2024    Primary Care Physician  Pcp Not In Computer    Consultants  obstetrics    Specialist Names: [facility] women's [facility] Dr. Ilsa Jorgensen    Code Status  Full Code    Chief Complaint  Chief Complaint   Patient presents with    Abdominal Pain       History of Presenting Illness  Camila Hills is a 49 y.o. female with history of fibroids, heavy menses who presented 11/24/2024 with abdominal pain and distention.    Developed abdominal pain and palpable mass since Friday.  She does report decreased appetite.  Patient reports her last period was from 11/1-8.  She is usually pretty regular with a few heavy days.  Her pain is usually suprapubic and she is never had abdominal pain before.  Has not needed a blood transfusion in years.    On presentation she has been hypertensive.  Labs significant for hemoglobin 9.6, alk phos 109    CT abdomen pelvis with contrast showed large heterogeneous pelvic mass with fluid density and solid components which extends into the lower abdomen and measures 20 x 18 x 10 cm in size.  Likely either ovarian or uterine in origin.  Mild right and moderate left hydronephrosis secondary to compression by the mass.  Enlargement with absent enhancement of the left femoral and external iliac vein likely representing deep venous thrombosis.    Pelvic ultrasound showed large complex pelvic mass with both solid and fluid components.  I did discuss this with Dr. Jorgensen GYN.  Will check tumor markers and she will consult GYN oncology Dr. Salazar    I discussed the plan of care with patient, bedside RN, and ERP GYN .    Review of Systems  Review of Systems   Gastrointestinal:  Positive for abdominal pain.       Past Medical History   has a past medical history of Anemia and Blood transfusion without reported diagnosis.    Surgical History   has no past surgical history on file.     Family History  family history includes Stroke in her  brother.   Family history reviewed with patient. There is no family history that is pertinent to the chief complaint.     Social History   reports that she has never smoked. She has never used smokeless tobacco. She reports current alcohol use. She reports that she does not use drugs.    Allergies  No Known Allergies    Medications  Prior to Admission Medications   Prescriptions Last Dose Informant Patient Reported? Taking?   Chlorphen-PE-Acetaminophen (ROBITUSSIN COLD/CONGESTION PO)   Yes No   Sig: Take  by mouth.   Patient not taking: Reported on 3/13/2024   Relugolix-Estradiol-Norethind 40-1-0.5 MG Tab   No No   Sig: Take 1 Tablet by mouth every day.   azithromycin (ZITHROMAX) 250 MG TABS   No No   Sig: Take  by mouth every day. 2 tabs by mouth day 1, 1 tab by mouth days 2-5   Patient not taking: Reported on 3/13/2024   ibuprofen (MOTRIN) 200 MG Tab   Yes No   Sig: Take 200 mg by mouth every 6 hours as needed.   Patient not taking: Reported on 10/30/2024   ibuprofen (MOTRIN) 800 MG Tab   No No   Sig: Take 1 Tablet by mouth every 8 hours as needed for Moderate Pain (Take 1 day before and for first three days of menses) for up to 210 doses. Indications: Excessive Amount of Menstrual Volume   promethazine-codeine (PHENERGAN-CODEINE) 6.25-10 MG/5ML SYRP   No No   Sig: Take 5-10 mL by mouth 4 times a day as needed for Cough.   Patient not taking: Reported on 3/13/2024      Facility-Administered Medications: None       Physical Exam  Temp:  [36.8 °C (98.2 °F)] 36.8 °C (98.2 °F)  Pulse:  [] 95  Resp:  [12-16] 14  BP: (132-176)/() 137/63  SpO2:  [95 %-97 %] 97 %  Blood Pressure: 137/63   Temperature: 36.8 °C (98.2 °F)   Pulse: 95   Respiration: 14   Pulse Oximetry: 97 %       Physical Exam  Vitals and nursing note reviewed.   Constitutional:       Appearance: Normal appearance. She is ill-appearing.   HENT:      Head: Normocephalic and atraumatic.      Right Ear: External ear normal.      Left Ear: External  "ear normal.      Nose: Nose normal.      Mouth/Throat:      Mouth: Mucous membranes are moist.      Pharynx: Oropharynx is clear. No oropharyngeal exudate or posterior oropharyngeal erythema.   Eyes:      Extraocular Movements: Extraocular movements intact.      Conjunctiva/sclera: Conjunctivae normal.   Cardiovascular:      Rate and Rhythm: Normal rate and regular rhythm.      Pulses: Normal pulses.      Heart sounds: Normal heart sounds. No murmur heard.  Pulmonary:      Effort: Pulmonary effort is normal. No respiratory distress.      Breath sounds: Normal breath sounds. No stridor. No wheezing or rales.   Abdominal:      General: Bowel sounds are normal. There is no distension.      Palpations: There is mass (Large palpable mass extending above umbilicus).      Tenderness: There is abdominal tenderness.   Musculoskeletal:      Cervical back: Normal range of motion.   Skin:     General: Skin is warm.      Capillary Refill: Capillary refill takes less than 2 seconds.   Neurological:      General: No focal deficit present.      Mental Status: She is alert and oriented to person, place, and time. Mental status is at baseline.      Cranial Nerves: No cranial nerve deficit.   Psychiatric:         Mood and Affect: Mood normal.         Behavior: Behavior normal.         Laboratory:  Recent Labs     11/24/24  1030   WBC 9.8   RBC 4.34   HEMOGLOBIN 9.6*   HEMATOCRIT 32.2*   MCV 74.2*   MCH 22.1*   MCHC 29.8*   RDW 44.6   PLATELETCT 355   MPV 9.0     Recent Labs     11/24/24  1030   SODIUM 140   POTASSIUM 3.7   CHLORIDE 104   CO2 23   GLUCOSE 96   BUN 13   CREATININE 0.64   CALCIUM 9.3     Recent Labs     11/24/24  1030   ALTSGPT 16   ASTSGOT 23   ALKPHOSPHAT 109*   TBILIRUBIN 0.6   LIPASE 23   GLUCOSE 96         No results for input(s): \"NTPROBNP\" in the last 72 hours.      No results for input(s): \"TROPONINT\" in the last 72 hours.    Imaging:  CT-ABDOMEN-PELVIS WITH   Final Result      1.  Large heterogeneous pelvic mass " with fluid density and solid components which extends into the lower abdomen and measures 20 x 18 x 10 cm in size. This is likely either ovarian or uterine in origin.      2.  Mild right and moderate left hydronephrosis secondary to compression by the mass. There is hypoenhancement of the left kidney consistent with significant obstruction.      3.  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      4.  A voalte message was sent to CORBY ARELLANO at 1205 PM on 11/24/2024.      US-PELVIC COMPLETE (TRANSABDOMINAL/TRANSVAGINAL) (COMBO)    (Results Pending)         Assessment/Plan:  Justification for Admission Status  I anticipate this patient will require at least two midnights for appropriate medical management, necessitating inpatient admission because pelvic mass, anemia, deep venous embolus is on heparin drip    Patient will need a Med/Surg bed on EMERGENCY service .  The need is secondary to pelvic mass, anemia, deep venous embolus is on heparin drip.    * Pelvic mass- (present on admission)  Assessment & Plan  Large pelvic mass.  Discussed with GYN Dr. Jorgensen.  She will check tumor markers and consult GYN oncology Dr. Salazar    Advanced care planning/counseling discussion  Assessment & Plan  Advance care planning discussion had with patient for 17 minutes.  Patient will be full code.    Microcytic anemia  Assessment & Plan  No active signs of bleeding  Her last period ended on 11/8  Check iron studies.  Monitor closely for bleed on heparin drip.  Will continue to trend with CBC  Transfuse to maintain hemoglobin greater than 7.  Results from last 7 days   Lab Units 11/24/24  1030   HGB 1503 g/dL 9.6*   HCT 1504 % 32.2*   MCV 1505 fL 74.2*       Acute deep vein thrombosis (DVT) of femoral vein of both lower extremities (HCC)  Assessment & Plan  Likely occlusive DVT seen on CT abdomen pelvis.  Likely from compressive mass  Started on  heparin drip  Ordered lower extremity ultrasound        VTE prophylaxis: therapeutic anticoagulation with heparin drip    Patient is critically ill.   The patient continues to have: DVT  The vital organ system that is affected is the: Hematologic  If untreated there is a high chance of deterioration into: Pulmonary embolism  And eventually death.   The critical care that I am providing today is: Heparin drip  The critical that has been undertaken is medically complex.   There has been no overlap in critical care time.   Critical Care Time not including procedures: 36

## 2024-11-25 ENCOUNTER — PHARMACY VISIT (OUTPATIENT)
Dept: PHARMACY | Facility: MEDICAL CENTER | Age: 49
End: 2024-11-25
Payer: COMMERCIAL

## 2024-11-25 VITALS
SYSTOLIC BLOOD PRESSURE: 140 MMHG | DIASTOLIC BLOOD PRESSURE: 95 MMHG | HEART RATE: 105 BPM | TEMPERATURE: 98.2 F | RESPIRATION RATE: 18 BRPM | BODY MASS INDEX: 24.63 KG/M2 | OXYGEN SATURATION: 97 % | HEIGHT: 62 IN | WEIGHT: 133.82 LBS

## 2024-11-25 LAB
AFP-TM SERPL-MCNC: 2 NG/ML (ref 0–9)
ALBUMIN SERPL BCP-MCNC: 4.1 G/DL (ref 3.2–4.9)
ALBUMIN/GLOB SERPL: 1.4 G/DL
ALP SERPL-CCNC: 98 U/L (ref 30–99)
ALT SERPL-CCNC: 13 U/L (ref 2–50)
ANION GAP SERPL CALC-SCNC: 13 MMOL/L (ref 7–16)
AST SERPL-CCNC: 24 U/L (ref 12–45)
BASOPHILS # BLD AUTO: 0.4 % (ref 0–1.8)
BASOPHILS # BLD: 0.04 K/UL (ref 0–0.12)
BILIRUB SERPL-MCNC: 0.5 MG/DL (ref 0.1–1.5)
BUN SERPL-MCNC: 15 MG/DL (ref 8–22)
CALCIUM ALBUM COR SERPL-MCNC: 8.8 MG/DL (ref 8.5–10.5)
CALCIUM SERPL-MCNC: 8.9 MG/DL (ref 8.5–10.5)
CHLORIDE SERPL-SCNC: 104 MMOL/L (ref 96–112)
CO2 SERPL-SCNC: 22 MMOL/L (ref 20–33)
CREAT SERPL-MCNC: 0.68 MG/DL (ref 0.5–1.4)
EOSINOPHIL # BLD AUTO: 0.15 K/UL (ref 0–0.51)
EOSINOPHIL NFR BLD: 1.6 % (ref 0–6.9)
ERYTHROCYTE [DISTWIDTH] IN BLOOD BY AUTOMATED COUNT: 44.3 FL (ref 35.9–50)
GFR SERPLBLD CREATININE-BSD FMLA CKD-EPI: 108 ML/MIN/1.73 M 2
GLOBULIN SER CALC-MCNC: 2.9 G/DL (ref 1.9–3.5)
GLUCOSE SERPL-MCNC: 92 MG/DL (ref 65–99)
HCT VFR BLD AUTO: 30 % (ref 37–47)
HGB BLD-MCNC: 9.1 G/DL (ref 12–16)
IMM GRANULOCYTES # BLD AUTO: 0.05 K/UL (ref 0–0.11)
IMM GRANULOCYTES NFR BLD AUTO: 0.5 % (ref 0–0.9)
LYMPHOCYTES # BLD AUTO: 2.03 K/UL (ref 1–4.8)
LYMPHOCYTES NFR BLD: 22.1 % (ref 22–41)
MAGNESIUM SERPL-MCNC: 2.1 MG/DL (ref 1.5–2.5)
MCH RBC QN AUTO: 22 PG (ref 27–33)
MCHC RBC AUTO-ENTMCNC: 30.3 G/DL (ref 32.2–35.5)
MCV RBC AUTO: 72.6 FL (ref 81.4–97.8)
MONOCYTES # BLD AUTO: 0.73 K/UL (ref 0–0.85)
MONOCYTES NFR BLD AUTO: 7.9 % (ref 0–13.4)
NEUTROPHILS # BLD AUTO: 6.19 K/UL (ref 1.82–7.42)
NEUTROPHILS NFR BLD: 67.5 % (ref 44–72)
NRBC # BLD AUTO: 0 K/UL
NRBC BLD-RTO: 0 /100 WBC (ref 0–0.2)
PHOSPHATE SERPL-MCNC: 4.3 MG/DL (ref 2.5–4.5)
PLATELET # BLD AUTO: 363 K/UL (ref 164–446)
PMV BLD AUTO: 8.7 FL (ref 9–12.9)
POTASSIUM SERPL-SCNC: 3.8 MMOL/L (ref 3.6–5.5)
PROT SERPL-MCNC: 7 G/DL (ref 6–8.2)
RBC # BLD AUTO: 4.13 M/UL (ref 4.2–5.4)
SODIUM SERPL-SCNC: 139 MMOL/L (ref 135–145)
TEST NAME 95000: NORMAL
WBC # BLD AUTO: 9.2 K/UL (ref 4.8–10.8)

## 2024-11-25 PROCEDURE — 700111 HCHG RX REV CODE 636 W/ 250 OVERRIDE (IP): Mod: JZ | Performed by: INTERNAL MEDICINE

## 2024-11-25 PROCEDURE — 84100 ASSAY OF PHOSPHORUS: CPT

## 2024-11-25 PROCEDURE — 80053 COMPREHEN METABOLIC PANEL: CPT

## 2024-11-25 PROCEDURE — 99239 HOSP IP/OBS DSCHRG MGMT >30: CPT | Performed by: INTERNAL MEDICINE

## 2024-11-25 PROCEDURE — 85025 COMPLETE CBC W/AUTO DIFF WBC: CPT

## 2024-11-25 PROCEDURE — RXMED WILLOW AMBULATORY MEDICATION CHARGE: Performed by: INTERNAL MEDICINE

## 2024-11-25 PROCEDURE — 700105 HCHG RX REV CODE 258: Performed by: INTERNAL MEDICINE

## 2024-11-25 PROCEDURE — 99285 EMERGENCY DEPT VISIT HI MDM: CPT

## 2024-11-25 PROCEDURE — 83735 ASSAY OF MAGNESIUM: CPT

## 2024-11-25 PROCEDURE — 36415 COLL VENOUS BLD VENIPUNCTURE: CPT

## 2024-11-25 RX ORDER — ACETAMINOPHEN 325 MG/1
650 TABLET ORAL EVERY 6 HOURS PRN
Status: ON HOLD | COMMUNITY
Start: 2024-11-25 | End: 2024-12-20

## 2024-11-25 RX ORDER — OXYCODONE HYDROCHLORIDE 10 MG/1
10 TABLET ORAL EVERY 6 HOURS PRN
Qty: 20 TABLET | Refills: 0 | Status: SHIPPED | OUTPATIENT
Start: 2024-11-25 | End: 2024-11-30

## 2024-11-25 RX ADMIN — SODIUM CHLORIDE 250 MG: 9 INJECTION, SOLUTION INTRAVENOUS at 09:06

## 2024-11-25 ASSESSMENT — FIBROSIS 4 INDEX: FIB4 SCORE: 0.86

## 2024-11-25 ASSESSMENT — ENCOUNTER SYMPTOMS
VOMITING: 0
WEIGHT LOSS: 0
FEVER: 0
CONSTIPATION: 0
NAUSEA: 0
SHORTNESS OF BREATH: 0
ABDOMINAL PAIN: 1
BACK PAIN: 0
COUGH: 0
BLOOD IN STOOL: 0
CHILLS: 0
HEADACHES: 0
DIZZINESS: 0
DIARRHEA: 0

## 2024-11-25 NOTE — ASSESSMENT & PLAN NOTE
No active signs of bleeding  Her last period ended on 11/8  Iron studies consistent with severe iron deficiency, IV iron replacement  Daily CBC

## 2024-11-25 NOTE — ASSESSMENT & PLAN NOTE
Likely occlusive DVT seen on CT abdomen pelvis.  Likely from compressive mass  Started on heparin drip  Ordered lower extremity ultrasound

## 2024-11-25 NOTE — CARE PLAN
The patient is Stable - Low risk of patient condition declining or worsening    Shift Goals  Clinical Goals: pain management, monitor output  Patient Goals: rest  Family Goals: updates on POC    Problem: Pain - Standard  Goal: Alleviation of pain or a reduction in pain to the patient’s comfort goal  Outcome: Progressing     Problem: Knowledge Deficit - Standard  Goal: Patient and family/care givers will demonstrate understanding of plan of care, disease process/condition, diagnostic tests and medications  Outcome: Progressing     Problem: Communication  Goal: The ability to communicate needs accurately and effectively will improve  Outcome: Progressing

## 2024-11-25 NOTE — PROGRESS NOTES
Monitor Summary  Rhythm: SR  Rate:   Ectopy: none  Measurements: .16/.08/.39  ---12 hr Chart Review---

## 2024-11-25 NOTE — DISCHARGE INSTRUCTIONS
Abdominal Pain, Adult  Many things can cause belly (abdominal) pain. Most times, belly pain is not dangerous. Many cases of belly pain can be watched and treated at home. Sometimes, though, belly pain is serious. Your doctor will try to find the cause of your belly pain.  Follow these instructions at home:    Medicines  Take over-the-counter and prescription medicines only as told by your doctor.  Do not take medicines that help you poop (laxatives) unless told by your doctor.  General instructions  Watch your belly pain for any changes.  Drink enough fluid to keep your pee (urine) pale yellow.  Keep all follow-up visits as told by your doctor. This is important.  Contact a doctor if:  Your belly pain changes or gets worse.  You are not hungry, or you lose weight without trying.  You are having trouble pooping (constipated) or have watery poop (diarrhea) for more than 2-3 days.  You have pain when you pee or poop.  Your belly pain wakes you up at night.  Your pain gets worse with meals, after eating, or with certain foods.  You are vomiting and cannot keep anything down.  You have a fever.  You have blood in your pee.  Get help right away if:  Your pain does not go away as soon as your doctor says it should.  You cannot stop vomiting.  Your pain is only in areas of your belly, such as the right side or the left lower part of the belly.  You have bloody or black poop, or poop that looks like tar.  You have very bad pain, cramping, or bloating in your belly.  You have signs of not having enough fluid or water in your body (dehydration), such as:  Dark pee, very little pee, or no pee.  Cracked lips.  Dry mouth.  Sunken eyes.  Sleepiness.  Weakness.  You have trouble breathing or chest pain.  Summary  Many cases of belly pain can be watched and treated at home.  Watch your belly pain for any changes.  Take over-the-counter and prescription medicines only as told by your doctor.  Contact a doctor if your belly pain  changes or gets worse.  Get help right away if you have very bad pain, cramping, or bloating in your belly.  This information is not intended to replace advice given to you by your health care provider. Make sure you discuss any questions you have with your health care provider.  Document Revised: 04/27/2020 Document Reviewed: 04/27/2020  [employer] Patient Education © 2023 [employer]    Anemia, Frequently Asked Questions  WHAT ARE THE SYMPTOMS OF ANEMIA?  Headache.   Difficulty thinking.   Fatigue.   Shortness of breath.   Weakness.   Rapid heartbeat.   AT WHAT POINT ARE PEOPLE CONSIDERED ANEMIC?   This varies with gender and age.   Both hemoglobin (Hgb) and hematocrit values are used to define anemia. These lab values are obtained from a complete blood count (CBC) test. This is performed at a caregiver's office.   The normal range of hemoglobin values for adult men is 14.0 g/dL to 17.4 g/dL. For nonpregnant women, values are 12.3 g/dL to 15.3 g/dL.   The World Health Organization defines anemia as less than 12 g/dL for nonpregnant women and less than 13 g/dL for men.   For adult males, the average normal hematocrit is 46%, and the range is 40% to 52%.   For adult females, the average normal hematocrit is 41%, and the range is 35% to 47%.   Values that fall below the lower limits can be a sign of anemia and should have further checking (evaluation).   GROUPS OF PEOPLE WHO ARE AT RISK FOR DEVELOPING ANEMIA INCLUDE:   Infants who are  or taking a formula that is not fortified with iron.   Children going through a rapid growth spurt. The iron available can not keep up with the needs for a red cell mass which must grow with the child.   Women in childbearing years. They need iron because of blood loss during menstruation.   Pregnant women. The growing fetus creates a high demand for iron.   People with ongoing gastrointestinal blood loss are at risk of developing iron deficiency.   Individuals with leukemia  or cancer who must receive chemotherapy or radiation to treat their disease. The drugs or radiation used to treat these diseases often decreases the bone marrow's ability to make cells of all classes. This includes red blood cells, white blood cells, and platelets.   Individuals with chronic inflammatory conditions such as rheumatoid arthritis or chronic infections.   The elderly.   ARE SOME TYPES OF ANEMIA INHERITED?   Yes, some types of anemia are due to inherited or genetic defects.   Sickle cell anemia. This occurs most often in people of , , and Mediterranean descent.   Thalassemia (or Breen's anemia). This type is found in people of Mediterranean and Southeast  descent. These types of anemia are common.   [place]i. This is rare.   CAN CERTAIN MEDICATIONS CAUSE A PERSON TO BECOME ANEMIC?   Yes. For example, drugs to fight cancer (chemotherapeutic agents) often cause anemia. These drugs can slow the bone marrow's ability to make red blood cells. If there are not enough red blood cells, the body does not get enough oxygen.  WHAT HEMATOCRIT LEVEL IS REQUIRED TO DONATE BLOOD?   The lower limit of an acceptable hematocrit for blood donors is 38%. If you have a low hematocrit value, you should schedule an appointment with your caregiver.  ARE BLOOD TRANSFUSIONS COMMONLY USED TO CORRECT ANEMIA, AND ARE THEY DANGEROUS?   They are used to treat anemia as a last resort. Your caregiver will find the cause of the anemia and correct it if possible. Most blood transfusions are given because of excessive bleeding at the time of surgery, with trauma, or because of bone marrow suppression in patients with cancer or leukemia on chemotherapy. Blood transfusions are safer than ever before. We also know that blood transfusions affect the immune system and may increase certain risks. There is also a concern for human error. In 1/16,000 transfusions, a patient receives a transfusion of blood that is not  matched with his or her blood type.   WHAT IS IRON DEFICIENCY ANEMIA AND CAN I CORRECT IT BY CHANGING MY DIET?   Iron is an essential part of hemoglobin. Without enough hemoglobin, anemia develops and the body does not get the right amount of oxygen. Iron deficiency anemia develops after the body has had a low level of iron for a long time. This is either caused by blood loss, not taking in or absorbing enough iron, or increased demands for iron (like pregnancy or rapid growth).   Foods from animal origin such as beef, chicken, and pork, are good sources of iron. Be sure to have one of these foods at each meal. Vitamin C helps your body absorb iron. Foods rich in Vitamin C include citrus, bell pepper, strawberries, spinach and cantaloupe. In some cases, iron supplements may be needed in order to correct the iron deficiency. In the case of poor absorption, extra iron may have to be given directly into the vein through a needle (intravenously).  I HAVE BEEN DIAGNOSED WITH IRON DEFICIENCY ANEMIA AND MY CAREGIVER PRESCRIBED IRON SUPPLEMENTS. HOW LONG WILL IT TAKE FOR MY BLOOD TO BECOME NORMAL?   It depends on the degree of anemia at the beginning of treatment. Most people with mild to moderate iron deficiency, anemia will correct the anemia over a period of 2 to 3 months. But after the anemia is corrected, the iron stored by the body is still low. Caregivers often suggest an additional 6 months of oral iron therapy once the anemia has been reversed. This will help prevent the iron deficiency anemia from quickly happening again. Non-anemic adult males should take iron supplements only under the direction of a doctor, too much iron can cause liver damage.   MY HEMOGLOBIN IS 9 G/DL AND I AM SCHEDULED FOR SURGERY. SHOULD I POSTPONE THE SURGERY?   If you have Hgb of 9, you should discuss this with your caregiver right away. Many patients with similar hemoglobin levels have had surgery without problems. If minimal blood loss  is expected for a minor procedure, no treatment may be necessary.   If a greater blood loss is expected for more extensive procedures, you should ask your caregiver about being treated with erythropoietin and iron. This is to accelerate the recovery of your hemoglobin to a normal level before surgery. An anemic patient who undergoes high-blood-loss surgery has a greater risk of surgical complications and need for a blood transfusion, which also carries some risk.   I HAVE BEEN TOLD THAT HEAVY MENSTRUAL PERIODS CAUSE ANEMIA. IS THERE ANYTHING I CAN DO TO PREVENT THE ANEMIA?   Anemia that results from heavy periods is usually due to iron deficiency. You can try to meet the increased demands for iron caused by the heavy monthly blood loss by increasing the intake of iron-rich foods. Iron supplements may be required. Discuss your concerns with your caregiver.  WHAT CAUSES ANEMIA DURING PREGNANCY?   Pregnancy places major demands on the body. The mother must meet the needs of both her body and her growing baby. The body needs enough iron and folate to make the right amount of red blood cells. To prevent anemia while pregnant, the mother should stay in close contact with her caregiver.   Be sure to eat a diet that has foods rich in iron and folate like liver and dark green leafy vegetables. Folate plays an important role in the normal development of a baby's spinal cord. Folate can help prevent serious disorders like spina bifida. If your diet does not provide adequate nutrients, you may want to talk with your caregiver about nutritional supplements.   WHAT IS THE RELATIONSHIP BETWEEN FIBROID TUMORS AND ANEMIA IN WOMEN?   The relationship is usually caused by the increased menstrual blood loss caused by fibroids. Good iron intake may be required to prevent iron deficiency anemia from developing.   Document Released: 07/26/2005 Document Revised: 03/11/2013 Document Reviewed: 01/10/2012  [employer]® Patient Information ©2013  [facility].          Abdominal Pain, Adult  Many things can cause belly (abdominal) pain. Most times, belly pain is not dangerous. Many cases of belly pain can be watched and treated at home. Sometimes, though, belly pain is serious. Your doctor will try to find the cause of your belly pain.  Follow these instructions at home:    Medicines  Take over-the-counter and prescription medicines only as told by your doctor.  Do not take medicines that help you poop (laxatives) unless told by your doctor.  General instructions  Watch your belly pain for any changes.  Drink enough fluid to keep your pee (urine) pale yellow.  Keep all follow-up visits as told by your doctor. This is important.  Contact a doctor if:  Your belly pain changes or gets worse.  You are not hungry, or you lose weight without trying.  You are having trouble pooping (constipated) or have watery poop (diarrhea) for more than 2-3 days.  You have pain when you pee or poop.  Your belly pain wakes you up at night.  Your pain gets worse with meals, after eating, or with certain foods.  You are vomiting and cannot keep anything down.  You have a fever.  You have blood in your pee.  Get help right away if:  Your pain does not go away as soon as your doctor says it should.  You cannot stop vomiting.  Your pain is only in areas of your belly, such as the right side or the left lower part of the belly.  You have bloody or black poop, or poop that looks like tar.  You have very bad pain, cramping, or bloating in your belly.  You have signs of not having enough fluid or water in your body (dehydration), such as:  Dark pee, very little pee, or no pee.  Cracked lips.  Dry mouth.  Sunken eyes.  Sleepiness.  Weakness.  You have trouble breathing or chest pain.  Summary  Many cases of belly pain can be watched and treated at home.  Watch your belly pain for any changes.  Take over-the-counter and prescription medicines only as told by your doctor.  Contact a doctor  if your belly pain changes or gets worse.  Get help right away if you have very bad pain, cramping, or bloating in your belly.  This information is not intended to replace advice given to you by your health care provider. Make sure you discuss any questions you have with your health care provider.  Document Revised: 04/27/2020 Document Reviewed: 04/27/2020  [employer] Patient Education © 2023 [employer]  Bloating  Bloating is the feeling of fullness in your belly. You may feel as though your pants are too tight. Often the cause of bloating is overeating, retaining fluids, or having gas in your bowel. It is also caused by swallowing air and eating foods that cause gas. Irritable bowel syndrome is one of the most common causes of bloating. Constipation is also a common cause. Sometimes more serious problems can cause bloating.  SYMPTOMS   Usually there is a feeling of fullness, as though your abdomen is bulged out. There may be mild discomfort.   DIAGNOSIS   Usually no particular testing is necessary for most bloating. If the condition persists and seems to become worse, your caregiver may do additional testing.   TREATMENT   There is no direct treatment for bloating.   Do not put gas into the bowel. Avoid chewing gum and sucking on candy. These tend to make you swallow air. Swallowing air can also be a nervous habit. Try to avoid this.   Avoiding high residue diets will help. Eat foods with soluble fibers (examples include root vegetables, apples, or barley) and substitute dairy products with soy and rice products. This helps irritable bowel syndrome.   If constipation is the cause, then a high residue diet with more fiber will help.   Avoid carbonated beverages.   Over-the-counter preparations are available that help reduce gas. Your pharmacist can help you with this.   SEEK MEDICAL CARE IF:   Bloating continues and seems to be getting worse.   You notice a weight gain.   You have a weight loss but the bloating is  getting worse.   You have changes in your bowel habits or develop nausea or vomiting.   SEEK IMMEDIATE MEDICAL CARE IF:   You develop shortness of breath or swelling in your legs.   You have an increase in abdominal pain or develop chest pain.   Document Released: 10/18/2007 Document Revised: 03/11/2013 Document Reviewed: 12/05/2008  [employer]® Patient Information ©2013 [employer], [employer].

## 2024-11-25 NOTE — PROGRESS NOTES
Patient up to bathroom to void, missed hat for measurement. PVR bladder scan >600mL per US tech. Abdomen distended, firm. Patient endorses fully emptying bladder, denies discomfort or further urge to void. Dr. Shah notified.

## 2024-11-25 NOTE — CONSULTS
"Gynecology Consult    CC: Abdominal distension    Requesting Clinician: Chilango Shah DO    HPI:  Camila Hills is a 49 y.o. year old  who presented to the ER with complaint of abdominal distension, tenseness and bloating since last two days. She states her abdomen became very firm and distended in a few days. She has had low appetite and eating very small amounts last few weeks. No changes with bowel/bladder.   States she does continue to have menstrual cycles regularly every 3 weeks, lasting 7-8 days with first two days being heavy. No irregular bleeding.   She was diagnosed with a large fibroid uterus in February due to pelvic pain and heavier menses.  Pelvic ultrasound on 2024 showed an enlarged uterus measuring 11.8 x 7.9 x 5.5cm with a normal endometrial stripe at 0.7cm.   There is a posterior intramural fibroid measuring 3.1 x 2.8cm, two lower uterine intramural fibroids measuring 1.9 and  2.2cm in diameter. An intramural fibroid  on the left body of the uterus measuring 3.3cm. Two subserosal fibroids on the posterior body of the uterus measuring 2.7 and 2.3cm and another on the right posterior body of the uterus measuring 2.5cm.   The right ovary showed a mildly complex cyst measuring 2.6cm with no solid components and internal daughter cyst.   The right ovary was not clearly visualized, however there were no left adnexal masses.     Presented to the ER and on imaging, an enlarged pelvic mass with \"fluid density and solid components which extends into the lower abdomen and measures 20 x 18 x 10 cm in size. This is likely either ovarian or uterine in origin. \" There was also bilateral moderate hydronephrosis present.     Denies fevers, chills, URI symptoms, nausea, vomiting, constipation, diarrhea, shortness of breath, chest pain, light headedness, dysuria, urgency, frequency, or vaginal discharge.    ROS: A comphrensive review of symptoms was performed.  Pertinent positive and " negatives are noted in HPI.     Past Medical History:   Diagnosis Date    Anemia     Blood transfusion without reported diagnosis      History reviewed. No pertinent surgical history.  OB History    Para Term  AB Living   2 2 2     2   SAB IAB Ectopic Molar Multiple Live Births             2      # Outcome Date GA Lbr Black/2nd Weight Sex Type Anes PTL Lv   2 Term 00    M Vag-Spont   SHUN   1 Term 10/02/95    F Vag-Spont   SHUN     Social History     Socioeconomic History    Marital status:      Spouse name: Not on file    Number of children: Not on file    Years of education: Not on file    Highest education level: Not on file   Occupational History    Not on file   Tobacco Use    Smoking status: Never    Smokeless tobacco: Never   Vaping Use    Vaping status: Never Used   Substance and Sexual Activity    Alcohol use: Yes     Comment: occ    Drug use: Never    Sexual activity: Yes     Partners: Male   Other Topics Concern    Not on file   Social History Narrative    Not on file     Social Drivers of Health     Financial Resource Strain: Not on file   Food Insecurity: No Food Insecurity (2024)    Hunger Vital Sign     Worried About Running Out of Food in the Last Year: Never true     Ran Out of Food in the Last Year: Never true   Transportation Needs: No Transportation Needs (2024)    PRAPARE - Transportation     Lack of Transportation (Medical): No     Lack of Transportation (Non-Medical): No   Physical Activity: Not on file   Stress: Not on file   Social Connections: Not on file   Intimate Partner Violence: Not At Risk (2024)    Humiliation, Afraid, Rape, and Kick questionnaire     Fear of Current or Ex-Partner: No     Emotionally Abused: No     Physically Abused: No     Sexually Abused: No   Housing Stability: Low Risk  (2024)    Housing Stability Vital Sign     Unable to Pay for Housing in the Last Year: No     Number of Times Moved in the Last Year: 0     Homeless  in the Last Year: No     @FAM@  Allergies: Patient has no known allergies.    Current Facility-Administered Medications:     acetaminophen (Tylenol) tablet 650 mg, 650 mg, Oral, Q6HRS PRN, Chilango Shah D.O.    Notify provider if pain remains uncontrolled, , , CONTINUOUS **AND** Use the Numeric Rating Scale (NRS), Recinos-Baker Faces (WBF), or FLACC on regular floors and Critical-Care Pain Observation Tool (CPOT) on ICUs/Trauma to assess pain, , , CONTINUOUS **AND** Pulse Ox, , , CONTINUOUS **AND** Pharmacy Consult Request ...Pain Management Review 1 Each, 1 Each, Other, PHARMACY TO DOSE **AND** If patient difficult to arouse and/or has respiratory depression (respiratory rate of 10 or less), stop any opiates that are currently infusing and call a Rapid Response., , , CONTINUOUS, Chilango Shah D.O.    oxyCODONE immediate-release (Roxicodone) tablet 5 mg, 5 mg, Oral, Q3HRS PRN, 5 mg at 11/24/24 1448 **OR** oxyCODONE immediate release (Roxicodone) tablet 10 mg, 10 mg, Oral, Q3HRS PRN **OR** HYDROmorphone (Dilaudid) injection 0.5 mg, 0.5 mg, Intravenous, Q3HRS PRN, Chilango Shah D.O.    senna-docusate (Pericolace Or Senokot S) 8.6-50 MG per tablet 2 Tablet, 2 Tablet, Oral, Q EVENING **AND** polyethylene glycol/lytes (Miralax) Packet 1 Packet, 1 Packet, Oral, QDAY PRN, Chilango Shah D.O.    labetalol (Normodyne/Trandate) injection 10 mg, 10 mg, Intravenous, Q4HRS PRN, Chilango Shah D.O.    ondansetron (Zofran) syringe/vial injection 4 mg, 4 mg, Intravenous, Q4HRS PRN, Chilango Shah D.O.    ondansetron (Zofran ODT) dispertab 4 mg, 4 mg, Oral, Q4HRS PRN, Chilango Shah D.O.    promethazine (Phenergan) tablet 12.5-25 mg, 12.5-25 mg, Oral, Q4HRS PRN, Chilango Shah D.O.    promethazine (Phenergan) suppository 12.5-25 mg, 12.5-25 mg, Rectal, Q4HRS PRN, Chilango Shah D.O.    prochlorperazine (Compazine) injection 5-10 mg, 5-10 mg, Intravenous, Q4HRS PRN, Chilango Shah D.O.    Patient Active Problem  "List    Diagnosis Date Noted    Pelvic mass 11/24/2024    Acute deep vein thrombosis (DVT) of femoral vein of both lower extremities (HCC) 11/24/2024    Microcytic anemia 11/24/2024       Objective:   BP (!) 157/96   Pulse (!) 101   Temp 36.9 °C (98.4 °F) (Temporal)   Resp 16   Ht 1.575 m (5' 2\")   Wt 60.9 kg (134 lb 4.2 oz)   SpO2 97%     GENERAL: Alert, in no apparent distress  PSYCHIATRIC: Appropriate affect, intact insight and judgement.  NECK:  Nontender, no masses.  No Thyromegaly or nodules. No lymphadenopathy.  RESPIRATORY: Normal respiratory effort.  Lungs clear to auscultation.   CARDIOVASCULAR: RRR, no murmur, gallop, or rub.  ABDOMEN: Firm, distended with palpable mass extending up above umbilicus,  nontender, nondistended.  No rebound or guarding.  No hepatosplenomegaly.  No hernias.  BACK: No CVA tenderness  EXTREMITIES: No edema  SKIN: No rash    GENITOURINARY:  Deferred      Lab Review  Recent Results (from the past 72 hours)   CBC WITH DIFFERENTIAL    Collection Time: 11/24/24 10:30 AM   Result Value Ref Range    WBC 9.8 4.8 - 10.8 K/uL    RBC 4.34 4.20 - 5.40 M/uL    Hemoglobin 9.6 (L) 12.0 - 16.0 g/dL    Hematocrit 32.2 (L) 37.0 - 47.0 %    MCV 74.2 (L) 81.4 - 97.8 fL    MCH 22.1 (L) 27.0 - 33.0 pg    MCHC 29.8 (L) 32.2 - 35.5 g/dL    RDW 44.6 35.9 - 50.0 fL    Platelet Count 355 164 - 446 K/uL    MPV 9.0 9.0 - 12.9 fL    Neutrophils-Polys 75.90 (H) 44.00 - 72.00 %    Lymphocytes 16.20 (L) 22.00 - 41.00 %    Monocytes 6.40 0.00 - 13.40 %    Eosinophils 0.70 0.00 - 6.90 %    Basophils 0.40 0.00 - 1.80 %    Immature Granulocytes 0.40 0.00 - 0.90 %    Nucleated RBC 0.00 0.00 - 0.20 /100 WBC    Neutrophils (Absolute) 7.41 1.82 - 7.42 K/uL    Lymphs (Absolute) 1.58 1.00 - 4.80 K/uL    Monos (Absolute) 0.63 0.00 - 0.85 K/uL    Eos (Absolute) 0.07 0.00 - 0.51 K/uL    Baso (Absolute) 0.04 0.00 - 0.12 K/uL    Immature Granulocytes (abs) 0.04 0.00 - 0.11 K/uL    NRBC (Absolute) 0.00 K/uL    " Hypochromia 1+     Anisocytosis 2+ (A)     Microcytosis 2+ (A)    COMP METABOLIC PANEL    Collection Time: 11/24/24 10:30 AM   Result Value Ref Range    Sodium 140 135 - 145 mmol/L    Potassium 3.7 3.6 - 5.5 mmol/L    Chloride 104 96 - 112 mmol/L    Co2 23 20 - 33 mmol/L    Anion Gap 13.0 7.0 - 16.0    Glucose 96 65 - 99 mg/dL    Bun 13 8 - 22 mg/dL    Creatinine 0.64 0.50 - 1.40 mg/dL    Calcium 9.3 8.5 - 10.5 mg/dL    Correct Calcium 8.9 8.5 - 10.5 mg/dL    AST(SGOT) 23 12 - 45 U/L    ALT(SGPT) 16 2 - 50 U/L    Alkaline Phosphatase 109 (H) 30 - 99 U/L    Total Bilirubin 0.6 0.1 - 1.5 mg/dL    Albumin 4.5 3.2 - 4.9 g/dL    Total Protein 7.5 6.0 - 8.2 g/dL    Globulin 3.0 1.9 - 3.5 g/dL    A-G Ratio 1.5 g/dL   LIPASE    Collection Time: 11/24/24 10:30 AM   Result Value Ref Range    Lipase 23 11 - 82 U/L   HCG QUAL SERUM    Collection Time: 11/24/24 10:30 AM   Result Value Ref Range    Beta-Hcg Qualitative Serum Negative Negative   ESTIMATED GFR    Collection Time: 11/24/24 10:30 AM   Result Value Ref Range    GFR (CKD-EPI) 109 >60 mL/min/1.73 m 2   PLATELET ESTIMATE    Collection Time: 11/24/24 10:30 AM   Result Value Ref Range    Plt Estimation Normal    MORPHOLOGY    Collection Time: 11/24/24 10:30 AM   Result Value Ref Range    RBC Morphology Present     Poikilocytosis 1+     Ovalocytes 1+    PERIPHERAL SMEAR REVIEW    Collection Time: 11/24/24 10:30 AM   Result Value Ref Range    Peripheral Smear Review see below    DIFFERENTIAL COMMENT    Collection Time: 11/24/24 10:30 AM   Result Value Ref Range    Comments-Diff see below    aPTT    Collection Time: 11/24/24  1:19 PM   Result Value Ref Range    APTT 27.2 24.7 - 36.0 sec   Prothrombin Time    Collection Time: 11/24/24  1:19 PM   Result Value Ref Range    PT 14.0 12.0 - 14.6 sec    INR 1.08 0.87 - 1.13   Heparin Xa (Unfractionated)    Collection Time: 11/24/24  1:19 PM   Result Value Ref Range    Heparin Xa (UFH) <0.10 IU/mL   URINALYSIS,CULTURE IF INDICATED     Collection Time: 11/24/24  3:20 PM    Specimen: Urine   Result Value Ref Range    Color Yellow     Character Clear     Specific Gravity >1.045 <1.035    Ph 5.0 5.0 - 8.0    Glucose Negative Negative mg/dL    Ketones 80 (A) Negative mg/dL    Protein Negative Negative mg/dL    Bilirubin Negative Negative    Urobilinogen, Urine 1.0 <=1.0 EU/dL    Nitrite Negative Negative    Leukocyte Esterase Negative Negative    Occult Blood Negative Negative    Micro Urine Req see below      Pap Smear 3/13/24 NILM  Endometrial biopsy results N/A    Imaging:    CT A/P    11/24/2024 11:32 AM     HISTORY/REASON FOR EXAM:  Abdominal distention..        TECHNIQUE/EXAM DESCRIPTION:   CT scan of the abdomen and pelvis with contrast.     Contrast-enhanced helical scanning was obtained from the diaphragmatic domes through the pubic symphysis following the bolus administration of nonionic contrast without complication.     100 mL of Omnipaque 350 nonionic contrast was administered without complication.     Low dose optimization technique was utilized for this CT exam including automated exposure control and adjustment of the mA and/or kV according to patient size.     COMPARISON: No prior studies available.     FINDINGS:  Lower Chest: There are bilateral breast implants..     Liver: Fatty change.     Spleen: Unremarkable.     Pancreas: Unremarkable.     Gallbladder: No calcified stones.     Biliary: Nondilated.     Adrenal glands: Normal.     Kidneys: There is mild right and moderate left hydronephrosis which extends to the level the upper pelvis where there is a large mass present. There is mild hypoenhancement of the left kidney compared to the right with asymmetric left renal enlargement   likely representing significant obstruction on the left..     Bowel: No obstruction or acute inflammation.     Lymph nodes: No adenopathy.     Vasculature: The left common femoral and iliac vein does not opacify and is dilated likely representing deep  venous arthrosis related to compression by the large mass.     Musculoskeletal: No acute or destructive process.     Pelvis: There is a very large mass within the pelvis which extends into the lower abdomen. The mass is heterogeneous in appearance with solid and fluid density components. The mass measures 20 x 18 x 10 cm in size..           IMPRESSION:     1.  Large heterogeneous pelvic mass with fluid density and solid components which extends into the lower abdomen and measures 20 x 18 x 10 cm in size. This is likely either ovarian or uterine in origin.     2.  Mild right and moderate left hydronephrosis secondary to compression by the mass. There is hypoenhancement of the left kidney consistent with significant obstruction.     3.  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        Vascular Laboratory   CONCLUSIONS   No deep venous thrombosis.    The iliac veins could not be visualized. Possible external compression due    to large uterine fibroid.       Toyin SERRANO   APRYL BRANNON      Exam Date:     2024 13:47      Room #:     Inpatient      Priority:     Routine      Ht (in):             Wt (lb):      Ordering Physician:        KAREN CARBONE      Referring Physician:       [id number] Reagan      Sonographer:               Thiago Loyd RDMS, T      Study Type:                Complete Bilateral      Technical Quality:         Adequate      Age:    49    Gender:     F      MRN:    [id number]      :    1975      BSA:      Indications:     Phlebitis and thrombophlebitis of lower extremities,                     unspecified      CPT Codes:       67186      ICD Codes:       451.2      History:         No prior study.      Limitations:      PROCEDURES:   Bilateral lower extremity venous duplex imaging.    The following venous structures were evaluated: common femoral, deep     femoral, proximal great saphenous, femoral, popliteal, peroneal, and    posterior tibial veins.    Serial compression, color, and spectral Doppler flow evaluations were    performed.       FINDINGS:   Right lower extremity.    All veins demonstrate complete color filling and compressibility.   No deep venous thrombosis.    Continuous, nonphasic flow throughout the leg.      Left lower extremity.    All veins demonstrate complete color filling and compressibility.   No deep venous thrombosis.    Diminished, nonphasic flow throughout the leg.      The iliac veins could not be visualized. Possible external compression due    to large uterine fibroid.     PELVIC US    2024 2:22 PM     HISTORY/REASON FOR EXAM:  Pelvic pain.        TECHNIQUE/EXAM DESCRIPTION:  Transabdominal and transvaginal pelvic ultrasound.     COMPARISON:   None     FINDINGS:  Both transabdominal and transvaginal scanning were performed to optimally visualize the pelvis.     There is a very large complex pelvic mass which is composed of solid and fluid components. Due to the very large size, discrete measurements cannot be made. Normal pelvic structures to include uterus and ovaries cannot be discretely identified.           IMPRESSION:     Large complex pelvic mass characterized by solid and fluid components. Due to the large size, the mass could not be accurately measured. The normal pelvic structures also cannot be identified.     Assessment:   49 y.o. year old  with complex appearing pelvic mass. Difficult to tell if arising from uterus or ovary.   History of uterine fibroids.   Compression of surrounding vessels and ureters.   Concern for underlying malignant process.   Patient is clinically and hemodynamically stable.       Plan:   Will order tumor markers.   Gyn Onc consult ordered.   Appreciate primary team care of patient's medical issues.     Ilsa Jorgensen M.D.    Obstetrics and Gynecology    2024 5:25 PM

## 2024-11-25 NOTE — PROGRESS NOTES
Bedside report received from off going RN/tech: Venus, assumed care of patient.   Overnight Events: no significant overnight events. Denies any pain at this time  A&O x 4  Oxygen: Room Air  SBP Ranged: 120's-140's  Patient on cardiac monitor: Yes SR ST 60's- low 100's   IVF/IV medications: Not Applicable   Fall Risk Score: NO RISK  Fall risk interventions in place: Place yellow fall risk ID band on patient, Provide patient/family education based on risk assessment, Educate patient/family to call staff for assistance when getting out of bed, Place fall precaution signage outside patient door, Place patient in room close to nursing station, and Notify charge of high risk for huddle  Bed type: Regular (Juwan Score less than 17 interventions in place)  Bedside sitter: Not Applicable   Isolation: Not applicable

## 2024-11-25 NOTE — ASSESSMENT & PLAN NOTE
Large pelvic mass.  Discussed with GYN Dr. Jorgensen.  She will check tumor markers and consult GYN oncology Dr. Salazar

## 2024-11-25 NOTE — PROGRESS NOTES
Bedside report received from off going RN: Ct, assumed care of patient. Assessment completed. No needs at this time.     Fall Risk Score: NO RISK  Fall risk interventions in place: Provide patient/family education based on risk assessment  Bed type: Regular (Juwan Score less than 17 interventions in place)  Patient on cardiac monitor: Yes  IVF/IV medications: Not Applicable   Oxygen: Room Air  Bedside sitter: Not Applicable   Isolation: Not applicable

## 2024-11-25 NOTE — CONSULTS
Gynecologic Oncology Consultation    Date: 2024    Requesting Physician: Dr. Ilsa Jorgensen    Consulting Physician: Daphnie Ortega M.D.     Reason for consultation: Pelvic mass    CC: abdominal distension, bloating, decreased appetite    HPI: This is a 49 y.o. female who presented reports increasing abdominal distension, bloating and abdominal discomfort. She reports she first noticed this several weeks ago and it worsened prompting presentation.  She reports she has a history of uterine fibroids, she most recently had an ultrasound May 2024 in [place] which she reports showed an apple sized cyst on her ovary, she has also been told she has uterine fibroids in the past but not this large. Reports regular menses every 3 weeks lasting a week.   She reports appetite is okay, some early satiety but no nausea or vomiting. Reports regular bowel movements, no issues with urination. Denies chest pain or shortness of breath. Reports pain has been controlled since admission with occasional oral medication.     Review of Systems   Constitutional:  Negative for chills, fever and weight loss.   HENT:  Negative for congestion.    Respiratory:  Negative for cough and shortness of breath.    Cardiovascular:  Negative for chest pain and leg swelling.   Gastrointestinal:  Positive for abdominal pain. Negative for blood in stool, constipation, diarrhea, nausea and vomiting.   Genitourinary:  Negative for dysuria.   Musculoskeletal:  Negative for back pain.   Neurological:  Negative for dizziness and headaches.        Past Medical History:   Diagnosis Date    Anemia     Blood transfusion without reported diagnosis      History reviewed. No pertinent surgical history.    OB/GYN History:  - SVDx2  Denies hx of STIs  Denies hx abnormal paps  Reports hx uterine fibroids    Current Facility-Administered Medications   Medication Dose Route Frequency Provider Last Rate Last Admin    acetaminophen (Tylenol) tablet 650 mg  650  mg Oral Q6HRS PRN Chilango Shah D.O.        Pharmacy Consult Request ...Pain Management Review 1 Each  1 Each Other PHARMACY TO DOSE Chilango Shah D.O.        oxyCODONE immediate-release (Roxicodone) tablet 5 mg  5 mg Oral Q3HRS PRN Chilango Shah D.O.   5 mg at 11/24/24 1448    Or    oxyCODONE immediate release (Roxicodone) tablet 10 mg  10 mg Oral Q3HRS PRN Chilango Shah D.O.        Or    HYDROmorphone (Dilaudid) injection 0.5 mg  0.5 mg Intravenous Q3HRS PRN Chilango Shah D.O.        senna-docusate (Pericolace Or Senokot S) 8.6-50 MG per tablet 2 Tablet  2 Tablet Oral Q EVENING Chilango Shah D.O.        And    polyethylene glycol/lytes (Miralax) Packet 1 Packet  1 Packet Oral QDAY PRN Chilango Shah D.O.        labetalol (Normodyne/Trandate) injection 10 mg  10 mg Intravenous Q4HRS PRN Chilango Shah D.O.        ondansetron (Zofran) syringe/vial injection 4 mg  4 mg Intravenous Q4HRS PRN Chilango Shah D.O.        ondansetron (Zofran ODT) dispertab 4 mg  4 mg Oral Q4HRS PRN Chilango Shah D.O.        promethazine (Phenergan) tablet 12.5-25 mg  12.5-25 mg Oral Q4HRS PRN Chilango Shah D.O.        promethazine (Phenergan) suppository 12.5-25 mg  12.5-25 mg Rectal Q4HRS PRN Chilango Shah D.O.        prochlorperazine (Compazine) injection 5-10 mg  5-10 mg Intravenous Q4HRS PRN Chilango Shah D.O.         Allergies:  Patient has no known allergies.    Social History     Socioeconomic History    Marital status:      Spouse name: Not on file    Number of children: Not on file    Years of education: Not on file    Highest education level: Not on file   Occupational History    Not on file   Tobacco Use    Smoking status: Never    Smokeless tobacco: Never   Vaping Use    Vaping status: Never Used   Substance and Sexual Activity    Alcohol use: Yes     Comment: occ    Drug use: Never    Sexual activity: Yes     Partners: Male   Other Topics Concern    Not on file   Social History  "Narrative    Not on file     Social Drivers of Health     Financial Resource Strain: Not on file   Food Insecurity: No Food Insecurity (11/24/2024)    Hunger Vital Sign     Worried About Running Out of Food in the Last Year: Never true     Ran Out of Food in the Last Year: Never true   Transportation Needs: No Transportation Needs (11/24/2024)    PRAPARE - Transportation     Lack of Transportation (Medical): No     Lack of Transportation (Non-Medical): No   Physical Activity: Not on file   Stress: Not on file   Social Connections: Not on file   Intimate Partner Violence: Not At Risk (11/24/2024)    Humiliation, Afraid, Rape, and Kick questionnaire     Fear of Current or Ex-Partner: No     Emotionally Abused: No     Physically Abused: No     Sexually Abused: No   Housing Stability: Low Risk  (11/24/2024)    Housing Stability Vital Sign     Unable to Pay for Housing in the Last Year: No     Number of Times Moved in the Last Year: 0     Homeless in the Last Year: No     Family History   Problem Relation Age of Onset    Stroke Brother         Youngest brother     Physical Exam:  /87   Pulse 82   Temp 36.8 °C (98.2 °F) (Temporal)   Resp 16   Ht 1.575 m (5' 2\")   Wt 60.9 kg (134 lb 4.2 oz)   SpO2 95%     Physical Exam  Constitutional:       General: She is not in acute distress.  Eyes:      General: No scleral icterus.  Cardiovascular:      Rate and Rhythm: Normal rate.   Pulmonary:      Effort: Pulmonary effort is normal. No respiratory distress.   Abdominal:      General: There is distension.      Palpations: Abdomen is soft. There is mass (firm mass palpated to 4 cm above umbilicus, immobile, also palpated in lower quadrants bilaterally).      Tenderness: There is no abdominal tenderness. There is no guarding.   Genitourinary:     General: Normal vulva.      Comments: Bimanual exam with normal cervix, smooth vaginal walls, immobile mass in pelvis/abdomen  Musculoskeletal:         General: No swelling or " tenderness.   Neurological:      Mental Status: She is alert.          Labs:  Recent Labs     11/24/24  1030   WBC 9.8   RBC 4.34   HEMOGLOBIN 9.6*   HEMATOCRIT 32.2*   MCV 74.2*   MCH 22.1*   MCHC 29.8*   RDW 44.6   PLATELETCT 355   MPV 9.0     Recent Labs     11/24/24  1030   SODIUM 140   POTASSIUM 3.7   CHLORIDE 104   CO2 23   GLUCOSE 96   BUN 13   CREATININE 0.64   CALCIUM 9.3     Recent Labs     11/24/24  1319   APTT 27.2   INR 1.08     Recent Labs     11/24/24  1030 11/24/24  1319   ASTSGOT 23  --    ALTSGPT 16  --    TBILIRUBIN 0.6  --    ALKPHOSPHAT 109*  --    GLOBULIN 3.0  --    INR  --  1.08     Radiology:  CT abd/pelvis 11/24/24  IMPRESSION:  1.  Large heterogeneous pelvic mass with fluid density and solid components which extends into the lower abdomen and measures 20 x 18 x 10 cm in size. This is likely either ovarian or uterine in origin.  2.  Mild right and moderate left hydronephrosis secondary to compression by the mass. There is hypoenhancement of the left kidney consistent with significant obstruction.  3.  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    TVUS 2/8/2024  Enlarged uterus measuring 11.8 x 7.9 x 5.5cm with a normal endometrial stripe at 0.7cm.   There is a posterior intramural fibroid measuring 3.1 x 2.8cm, two lower uterine intramural fibroids measuring 1.9 and  2.2cm in diameter. An intramural fibroid  on the left body of the uterus measuring 3.3cm. Two subserosal fibroids on the posterior body of the uterus measuring 2.7 and 2.3cm and another on the right posterior body of the uterus measuring 2.5cm.   The right ovary showed a mildly complex cyst measuring 2.6cm with no solid components and internal daughter cyst.   The right ovary was not clearly visualized, however there were no left adnexal masses.     Assessment/Plan: This is a 49 y.o. female who presents with abdominal distension and  bloating    Pelvic mass  - 11/24 CT with heterogeneous cystic and solid pelvic mass, unclear if uterine and/or ovarian origin. No other findings of metastatic disease. Associated hydronephrosis, possible compression of pelvic vasculature  - Bilateral LE dopplers negative for DVT  - Tumor markers CA-125=32.9, CA 19-9 6.6, CEA 1.7, LDH elevated 409  - New large pelvic mass, last imaging 5/2024 following uterine fibroids did not have this mass present. Unclear on image review if this is ovarian versus uterine (degenerating fibroids vs leiomyosarcoma) origin or both. Reviewed with patient surgery would be recommended for removal and pathologic evaluation. No indication for urgent surgery, pain is controlled and patient has normal bowel function. No need for continued admission from Gynecologic Oncology standpoint  - Discussed return precautions/when to call, for worsening pain, persistent nausea/vomiting, or no flatus/bowel movement  - Will see outpatient for pre-op visit, tentative surgery 12/10/24    Bilateral hydronephrosis  - Moderate left hydronephrosis, mild right hydronephrosis secondary to compression by the mass  - Creatinine 0.64    Anemia   - Hgb 9.6, microcytic. Iron studies consistent with iron deficiency     Thank you for for allowing us to participate in this patient's care. We will plan to see her in the office. Please feel free to contact us for any questions or if we can be of any further assistance.      Daphnie Ortega M.D.

## 2024-11-25 NOTE — DISCHARGE SUMMARY
Discharge Summary    CHIEF COMPLAINT ON ADMISSION  Chief Complaint   Patient presents with    Abdominal Pain       Reason for Admission  ABD pain     Admission Date  11/24/2024    CODE STATUS  Full Code    HPI & HOSPITAL COURSE  This is a 49 y.o. female here with abdominal pain     49 y.o. female with history of fibroids, heavy menses who presented 11/24/2024 with abdominal pain and distention. Developed abdominal pain and palpable mass since Friday.  She does report decreased appetite.  Patient reports her last period was from 11/1-8.  She is usually pretty regular with a few heavy days.  Her pain is usually suprapubic and she is never had abdominal pain before.  Has not needed a blood transfusion in years.     CT abdomen pelvis with contrast showed large heterogeneous pelvic mass with fluid density and solid components which extends into the lower abdomen and measures 20 x 18 x 10 cm in size.  Likely either ovarian or uterine in origin.  Mild right and moderate left hydronephrosis secondary to compression by the mass.  Enlargement with absent enhancement of the left femoral and external iliac vein likely representing deep venous thrombosis.  Pelvic ultrasound showed large complex pelvic mass with both solid and fluid components.  Gynecology was consulted checked tumor markers which were within normal limits and consulted gynecology oncology.  Discussed with gynecology oncology who recommended nonurgent surgery tentatively scheduled outpatient on December 10.  Patient's iron studies consistent with severe iron deficiency given a dose of IV iron prior to discharge.  She reports that she already has oral iron at home that her primary care physician told her to start taking when she saw her a week ago.  Patient's pain is well-controlled with current pain regimen.  Despite having mild and moderate hydronephrosis her kidney function is within normal limits, recommend repeat imaging postoperatively to ensure resolution.   Patient's vital signs are stable and she is agreeable to discharge home with as needed oxycodone for pain control.  She has been educated that if her pain is moderate she can cut the oxycodone in half.  She will need to follow-up with primary care for further pain management.  He is to return to the ER if her condition worsens    Therefore, she is discharged in good and stable condition to home with close outpatient follow-up.    The patient recovered much more quickly than anticipated on admission.    Discharge Date  11/25/2024    FOLLOW UP ITEMS POST DISCHARGE  Follow-up with gynecology oncology for surgery planned for December 10  Follow-up with primary care    DISCHARGE DIAGNOSES  Principal Problem:    Pelvic mass (POA: Yes)  Active Problems:    Acute deep vein thrombosis (DVT) of femoral vein of both lower extremities (HCC) (POA: Unknown)    Microcytic anemia (POA: Unknown)    Advanced care planning/counseling discussion (POA: Unknown)  Resolved Problems:    * No resolved hospital problems. *      FOLLOW UP  No future appointments.  Daphnie Ortega M.D.  [address]  [place] [address]  [phone]    Follow up  Surgery is tentatively planned for December 10th, the office will call you for follow up and to confirm your sugrery date.    PCP    Schedule an appointment as soon as possible for a visit in 1 week(s)        MEDICATIONS ON DISCHARGE     Medication List        START taking these medications        Instructions   acetaminophen 325 MG Tabs  Commonly known as: Tylenol   Take 2 Tablets by mouth every 6 hours as needed for Fever, Moderate Pain or Mild Pain.  Dose: 650 mg     oxyCODONE immediate release 10 MG immediate release tablet  Commonly known as: Roxicodone   Take 1 Tablet by mouth every 6 hours as needed for Moderate Pain or Severe Pain for up to 5 days.  Dose: 10 mg            CONTINUE taking these medications        Instructions   Gas-X Extra Strength 125 MG chewable  tablet  Generic drug: simethicone   Chew 125 mg 1 time a day as needed for Flatulence.  Dose: 125 mg     ibuprofen 800 MG Tabs  Commonly known as: Motrin   Take 800 mg by mouth every 8 hours as needed for Moderate Pain.  Dose: 800 mg     meclizine 25 MG Tabs  Commonly known as: Antivert   Take 25 mg by mouth every 6 hours as needed for Dizziness or Vertigo.  Dose: 25 mg     TUMS PO   Take 2 Tablets by mouth 1 time a day as needed (Indigestion).  Dose: 2 Tablet              Allergies  No Known Allergies    DIET  Orders Placed This Encounter   Procedures    Diet Order Diet: Regular     Standing Status:   Standing     Number of Occurrences:   1     Order Specific Question:   Diet:     Answer:   Regular [1]       ACTIVITY  As tolerated.  Weight bearing as tolerated    CONSULTATIONS  Gynecology  Gynecology oncology    PROCEDURES  None    LABORATORY  Lab Results   Component Value Date    SODIUM 139 11/25/2024    POTASSIUM 3.8 11/25/2024    CHLORIDE 104 11/25/2024    CO2 22 11/25/2024    GLUCOSE 92 11/25/2024    BUN 15 11/25/2024    CREATININE 0.68 11/25/2024        Lab Results   Component Value Date    WBC 9.2 11/25/2024    HEMOGLOBIN 9.1 (L) 11/25/2024    HEMATOCRIT 30.0 (L) 11/25/2024    PLATELETCT 363 11/25/2024        Total time of the discharge process exceeds 36 minutes.

## 2024-11-27 LAB — MISCELLANEOUS LAB RESULT MISCLAB: NORMAL

## 2024-12-09 ENCOUNTER — APPOINTMENT (OUTPATIENT)
Dept: ADMISSIONS | Facility: MEDICAL CENTER | Age: 49
End: 2024-12-09
Attending: SPECIALIST
Payer: COMMERCIAL

## 2024-12-13 ENCOUNTER — PRE-ADMISSION TESTING (OUTPATIENT)
Dept: ADMISSIONS | Facility: MEDICAL CENTER | Age: 49
End: 2024-12-13
Attending: SPECIALIST
Payer: COMMERCIAL

## 2024-12-13 RX ORDER — OXYCODONE HYDROCHLORIDE 5 MG/1
10 TABLET ORAL EVERY 6 HOURS PRN
Status: ON HOLD | COMMUNITY
End: 2024-12-20

## 2024-12-13 NOTE — OR NURSING
Pre admit phone call completed with patient. Pt states understanding of instructions including to increase oral hydration the day prior to surgery.

## 2024-12-17 ENCOUNTER — PRE-ADMISSION TESTING (OUTPATIENT)
Dept: ADMISSIONS | Facility: MEDICAL CENTER | Age: 49
End: 2024-12-17
Attending: SPECIALIST
Payer: COMMERCIAL

## 2024-12-17 ENCOUNTER — HOSPITAL ENCOUNTER (OUTPATIENT)
Dept: RADIOLOGY | Facility: MEDICAL CENTER | Age: 49
End: 2024-12-17
Attending: SPECIALIST
Payer: COMMERCIAL

## 2024-12-17 DIAGNOSIS — Z01.812 PRE-OPERATIVE LABORATORY EXAMINATION: ICD-10-CM

## 2024-12-17 DIAGNOSIS — Z01.810 PRE-OPERATIVE CARDIOVASCULAR EXAMINATION: ICD-10-CM

## 2024-12-17 DIAGNOSIS — Z01.811 PRE-OPERATIVE RESPIRATORY EXAMINATION: ICD-10-CM

## 2024-12-17 LAB
ABO GROUP BLD: NORMAL
ALBUMIN SERPL BCP-MCNC: 4.3 G/DL (ref 3.2–4.9)
ALBUMIN/GLOB SERPL: 1.4 G/DL
ALP SERPL-CCNC: 90 U/L (ref 30–99)
ALT SERPL-CCNC: 7 U/L (ref 2–50)
ANION GAP SERPL CALC-SCNC: 10 MMOL/L (ref 7–16)
ANISOCYTOSIS BLD QL SMEAR: ABNORMAL
APTT PPP: 26.6 SEC (ref 24.7–36)
AST SERPL-CCNC: 21 U/L (ref 12–45)
B-HCG SERPL-ACNC: <1 MIU/ML (ref 0–5)
BASOPHILS # BLD AUTO: 0.6 % (ref 0–1.8)
BASOPHILS # BLD: 0.04 K/UL (ref 0–0.12)
BILIRUB SERPL-MCNC: 0.4 MG/DL (ref 0.1–1.5)
BLD GP AB SCN SERPL QL: NORMAL
BUN SERPL-MCNC: 11 MG/DL (ref 8–22)
CALCIUM ALBUM COR SERPL-MCNC: 9 MG/DL (ref 8.5–10.5)
CALCIUM SERPL-MCNC: 9.2 MG/DL (ref 8.5–10.5)
CHLORIDE SERPL-SCNC: 105 MMOL/L (ref 96–112)
CO2 SERPL-SCNC: 24 MMOL/L (ref 20–33)
COMMENT 1642: NORMAL
CREAT SERPL-MCNC: 0.59 MG/DL (ref 0.5–1.4)
EKG IMPRESSION: NORMAL
EOSINOPHIL # BLD AUTO: 0.11 K/UL (ref 0–0.51)
EOSINOPHIL NFR BLD: 1.7 % (ref 0–6.9)
ERYTHROCYTE [DISTWIDTH] IN BLOOD BY AUTOMATED COUNT: 59.8 FL (ref 35.9–50)
EST. AVERAGE GLUCOSE BLD GHB EST-MCNC: 100 MG/DL
GFR SERPLBLD CREATININE-BSD FMLA CKD-EPI: 111 ML/MIN/1.73 M 2
GLOBULIN SER CALC-MCNC: 3 G/DL (ref 1.9–3.5)
GLUCOSE SERPL-MCNC: 94 MG/DL (ref 65–99)
HBA1C MFR BLD: 5.1 % (ref 4–5.6)
HCT VFR BLD AUTO: 33.6 % (ref 37–47)
HGB BLD-MCNC: 9.9 G/DL (ref 12–16)
IMM GRANULOCYTES # BLD AUTO: 0.02 K/UL (ref 0–0.11)
IMM GRANULOCYTES NFR BLD AUTO: 0.3 % (ref 0–0.9)
INR PPP: 1.04 (ref 0.87–1.13)
LYMPHOCYTES # BLD AUTO: 1.81 K/UL (ref 1–4.8)
LYMPHOCYTES NFR BLD: 28.6 % (ref 22–41)
MCH RBC QN AUTO: 22.9 PG (ref 27–33)
MCHC RBC AUTO-ENTMCNC: 29.5 G/DL (ref 32.2–35.5)
MCV RBC AUTO: 77.6 FL (ref 81.4–97.8)
MICROCYTES BLD QL SMEAR: ABNORMAL
MONOCYTES # BLD AUTO: 0.51 K/UL (ref 0–0.85)
MONOCYTES NFR BLD AUTO: 8.1 % (ref 0–13.4)
MORPHOLOGY BLD-IMP: NORMAL
NEUTROPHILS # BLD AUTO: 3.84 K/UL (ref 1.82–7.42)
NEUTROPHILS NFR BLD: 60.7 % (ref 44–72)
NRBC # BLD AUTO: 0 K/UL
NRBC BLD-RTO: 0 /100 WBC (ref 0–0.2)
OVALOCYTES BLD QL SMEAR: NORMAL
PLATELET # BLD AUTO: 447 K/UL (ref 164–446)
PLATELET BLD QL SMEAR: NORMAL
PMV BLD AUTO: 8.5 FL (ref 9–12.9)
POIKILOCYTOSIS BLD QL SMEAR: NORMAL
POTASSIUM SERPL-SCNC: 3.7 MMOL/L (ref 3.6–5.5)
PROT SERPL-MCNC: 7.3 G/DL (ref 6–8.2)
PROTHROMBIN TIME: 13.6 SEC (ref 12–14.6)
RBC # BLD AUTO: 4.33 M/UL (ref 4.2–5.4)
RBC BLD AUTO: PRESENT
RH BLD: NORMAL
SODIUM SERPL-SCNC: 139 MMOL/L (ref 135–145)
WBC # BLD AUTO: 6.3 K/UL (ref 4.8–10.8)

## 2024-12-17 PROCEDURE — 86850 RBC ANTIBODY SCREEN: CPT

## 2024-12-17 PROCEDURE — 71045 X-RAY EXAM CHEST 1 VIEW: CPT

## 2024-12-17 PROCEDURE — 36415 COLL VENOUS BLD VENIPUNCTURE: CPT

## 2024-12-17 PROCEDURE — 93010 ELECTROCARDIOGRAM REPORT: CPT | Performed by: INTERNAL MEDICINE

## 2024-12-17 PROCEDURE — 80053 COMPREHEN METABOLIC PANEL: CPT

## 2024-12-17 PROCEDURE — 83036 HEMOGLOBIN GLYCOSYLATED A1C: CPT

## 2024-12-17 PROCEDURE — 86900 BLOOD TYPING SEROLOGIC ABO: CPT

## 2024-12-17 PROCEDURE — 85025 COMPLETE CBC W/AUTO DIFF WBC: CPT

## 2024-12-17 PROCEDURE — 93005 ELECTROCARDIOGRAM TRACING: CPT | Mod: TC

## 2024-12-17 PROCEDURE — 84702 CHORIONIC GONADOTROPIN TEST: CPT

## 2024-12-17 PROCEDURE — 86901 BLOOD TYPING SEROLOGIC RH(D): CPT

## 2024-12-17 PROCEDURE — 85730 THROMBOPLASTIN TIME PARTIAL: CPT

## 2024-12-17 PROCEDURE — 85610 PROTHROMBIN TIME: CPT

## 2024-12-20 ENCOUNTER — ANESTHESIA EVENT (OUTPATIENT)
Dept: SURGERY | Facility: MEDICAL CENTER | Age: 49
End: 2024-12-20
Payer: COMMERCIAL

## 2024-12-20 ENCOUNTER — HOSPITAL ENCOUNTER (OUTPATIENT)
Facility: MEDICAL CENTER | Age: 49
End: 2024-12-21
Attending: SPECIALIST | Admitting: SPECIALIST
Payer: COMMERCIAL

## 2024-12-20 ENCOUNTER — ANESTHESIA (OUTPATIENT)
Dept: SURGERY | Facility: MEDICAL CENTER | Age: 49
End: 2024-12-20
Payer: COMMERCIAL

## 2024-12-20 ENCOUNTER — APPOINTMENT (OUTPATIENT)
Dept: RADIOLOGY | Facility: MEDICAL CENTER | Age: 49
End: 2024-12-20
Payer: COMMERCIAL

## 2024-12-20 LAB
ABO + RH BLD: NORMAL
HCG UR QL: NEGATIVE

## 2024-12-20 PROCEDURE — G0378 HOSPITAL OBSERVATION PER HR: HCPCS

## 2024-12-20 PROCEDURE — 160048 HCHG OR STATISTICAL LEVEL 1-5: Performed by: SPECIALIST

## 2024-12-20 PROCEDURE — 770030 HCHG ROOM/CARE - EXTENDED RECOVERY EACH 15 MIN

## 2024-12-20 PROCEDURE — 160035 HCHG PACU - 1ST 60 MINS PHASE I: Performed by: SPECIALIST

## 2024-12-20 PROCEDURE — 86901 BLOOD TYPING SEROLOGIC RH(D): CPT

## 2024-12-20 PROCEDURE — A9270 NON-COVERED ITEM OR SERVICE: HCPCS | Performed by: ANESTHESIOLOGY

## 2024-12-20 PROCEDURE — 700105 HCHG RX REV CODE 258: Performed by: SPECIALIST

## 2024-12-20 PROCEDURE — 88331 PATH CONSLTJ SURG 1 BLK 1SPC: CPT

## 2024-12-20 PROCEDURE — 700101 HCHG RX REV CODE 250: Performed by: SPECIALIST

## 2024-12-20 PROCEDURE — 700105 HCHG RX REV CODE 258

## 2024-12-20 PROCEDURE — 160036 HCHG PACU - EA ADDL 30 MINS PHASE I: Performed by: SPECIALIST

## 2024-12-20 PROCEDURE — 700102 HCHG RX REV CODE 250 W/ 637 OVERRIDE(OP): Performed by: ANESTHESIOLOGY

## 2024-12-20 PROCEDURE — 86900 BLOOD TYPING SEROLOGIC ABO: CPT

## 2024-12-20 PROCEDURE — 160031 HCHG SURGERY MINUTES - 1ST 30 MINS LEVEL 5: Performed by: SPECIALIST

## 2024-12-20 PROCEDURE — 88309 TISSUE EXAM BY PATHOLOGIST: CPT

## 2024-12-20 PROCEDURE — C2617 STENT, NON-COR, TEM W/O DEL: HCPCS | Performed by: SPECIALIST

## 2024-12-20 PROCEDURE — 502714 HCHG ROBOTIC SURGERY SERVICES: Performed by: SPECIALIST

## 2024-12-20 PROCEDURE — 700111 HCHG RX REV CODE 636 W/ 250 OVERRIDE (IP): Performed by: ANESTHESIOLOGY

## 2024-12-20 PROCEDURE — C1725 CATH, TRANSLUMIN NON-LASER: HCPCS | Performed by: SPECIALIST

## 2024-12-20 PROCEDURE — 88304 TISSUE EXAM BY PATHOLOGIST: CPT

## 2024-12-20 PROCEDURE — 88342 IMHCHEM/IMCYTCHM 1ST ANTB: CPT

## 2024-12-20 PROCEDURE — 700101 HCHG RX REV CODE 250: Performed by: ANESTHESIOLOGY

## 2024-12-20 PROCEDURE — 36415 COLL VENOUS BLD VENIPUNCTURE: CPT

## 2024-12-20 PROCEDURE — C1769 GUIDE WIRE: HCPCS | Performed by: SPECIALIST

## 2024-12-20 PROCEDURE — 88112 CYTOPATH CELL ENHANCE TECH: CPT

## 2024-12-20 PROCEDURE — 160042 HCHG SURGERY MINUTES - EA ADDL 1 MIN LEVEL 5: Performed by: SPECIALIST

## 2024-12-20 PROCEDURE — 88305 TISSUE EXAM BY PATHOLOGIST: CPT

## 2024-12-20 PROCEDURE — 160009 HCHG ANES TIME/MIN: Performed by: SPECIALIST

## 2024-12-20 PROCEDURE — 88341 IMHCHEM/IMCYTCHM EA ADD ANTB: CPT | Mod: 91

## 2024-12-20 PROCEDURE — C1758 CATHETER, URETERAL: HCPCS | Performed by: SPECIALIST

## 2024-12-20 PROCEDURE — 160002 HCHG RECOVERY MINUTES (STAT): Performed by: SPECIALIST

## 2024-12-20 PROCEDURE — 74018 RADEX ABDOMEN 1 VIEW: CPT

## 2024-12-20 PROCEDURE — 81025 URINE PREGNANCY TEST: CPT

## 2024-12-20 DEVICE — STENT UROLOGICAL POLARIS 6X22 ULTRA: Type: IMPLANTABLE DEVICE | Site: URETER | Status: FUNCTIONAL

## 2024-12-20 RX ORDER — ONDANSETRON 2 MG/ML
4 INJECTION INTRAMUSCULAR; INTRAVENOUS
Status: DISCONTINUED | OUTPATIENT
Start: 2024-12-20 | End: 2024-12-20 | Stop reason: HOSPADM

## 2024-12-20 RX ORDER — HALOPERIDOL 5 MG/ML
1 INJECTION INTRAMUSCULAR
Status: DISCONTINUED | OUTPATIENT
Start: 2024-12-20 | End: 2024-12-20 | Stop reason: HOSPADM

## 2024-12-20 RX ORDER — SODIUM CHLORIDE, SODIUM LACTATE, POTASSIUM CHLORIDE, CALCIUM CHLORIDE 600; 310; 30; 20 MG/100ML; MG/100ML; MG/100ML; MG/100ML
INJECTION, SOLUTION INTRAVENOUS CONTINUOUS
Status: ACTIVE | OUTPATIENT
Start: 2024-12-20 | End: 2024-12-20

## 2024-12-20 RX ORDER — EPHEDRINE SULFATE 50 MG/ML
INJECTION, SOLUTION INTRAVENOUS PRN
Status: DISCONTINUED | OUTPATIENT
Start: 2024-12-20 | End: 2024-12-20 | Stop reason: SURG

## 2024-12-20 RX ORDER — CARBOXYMETHYLCELLULOSE SODIUM 5 MG/ML
1 SOLUTION/ DROPS OPHTHALMIC PRN
Status: DISCONTINUED | OUTPATIENT
Start: 2024-12-20 | End: 2024-12-21 | Stop reason: HOSPADM

## 2024-12-20 RX ORDER — LIDOCAINE HYDROCHLORIDE 20 MG/ML
INJECTION, SOLUTION EPIDURAL; INFILTRATION; INTRACAUDAL; PERINEURAL PRN
Status: DISCONTINUED | OUTPATIENT
Start: 2024-12-20 | End: 2024-12-20 | Stop reason: SURG

## 2024-12-20 RX ORDER — IBUPROFEN 200 MG
600 TABLET ORAL EVERY 6 HOURS PRN
Status: DISCONTINUED | OUTPATIENT
Start: 2024-12-20 | End: 2024-12-21 | Stop reason: HOSPADM

## 2024-12-20 RX ORDER — CIPROFLOXACIN 500 MG/1
500 TABLET, FILM COATED ORAL EVERY 12 HOURS
Status: DISCONTINUED | OUTPATIENT
Start: 2024-12-20 | End: 2024-12-21 | Stop reason: HOSPADM

## 2024-12-20 RX ORDER — SODIUM CHLORIDE, SODIUM LACTATE, POTASSIUM CHLORIDE, AND CALCIUM CHLORIDE .6; .31; .03; .02 G/100ML; G/100ML; G/100ML; G/100ML
1000 INJECTION, SOLUTION INTRAVENOUS ONCE
Status: COMPLETED | OUTPATIENT
Start: 2024-12-20 | End: 2024-12-20

## 2024-12-20 RX ORDER — ROCURONIUM BROMIDE 10 MG/ML
INJECTION, SOLUTION INTRAVENOUS PRN
Status: DISCONTINUED | OUTPATIENT
Start: 2024-12-20 | End: 2024-12-20 | Stop reason: SURG

## 2024-12-20 RX ORDER — HYDROMORPHONE HYDROCHLORIDE 1 MG/ML
0.1 INJECTION, SOLUTION INTRAMUSCULAR; INTRAVENOUS; SUBCUTANEOUS
Status: DISCONTINUED | OUTPATIENT
Start: 2024-12-20 | End: 2024-12-20 | Stop reason: HOSPADM

## 2024-12-20 RX ORDER — OXYCODONE HCL 5 MG/5 ML
5 SOLUTION, ORAL ORAL
Status: COMPLETED | OUTPATIENT
Start: 2024-12-20 | End: 2024-12-20

## 2024-12-20 RX ORDER — HYDROMORPHONE HYDROCHLORIDE 2 MG/ML
INJECTION, SOLUTION INTRAMUSCULAR; INTRAVENOUS; SUBCUTANEOUS PRN
Status: DISCONTINUED | OUTPATIENT
Start: 2024-12-20 | End: 2024-12-20 | Stop reason: SURG

## 2024-12-20 RX ORDER — OXYCODONE HCL 5 MG/5 ML
10 SOLUTION, ORAL ORAL
Status: COMPLETED | OUTPATIENT
Start: 2024-12-20 | End: 2024-12-20

## 2024-12-20 RX ORDER — BUPIVACAINE HYDROCHLORIDE AND EPINEPHRINE 2.5; 5 MG/ML; UG/ML
INJECTION, SOLUTION EPIDURAL; INFILTRATION; INTRACAUDAL; PERINEURAL
Status: DISCONTINUED | OUTPATIENT
Start: 2024-12-20 | End: 2024-12-20 | Stop reason: HOSPADM

## 2024-12-20 RX ORDER — ONDANSETRON 2 MG/ML
4 INJECTION INTRAMUSCULAR; INTRAVENOUS EVERY 6 HOURS PRN
Status: DISCONTINUED | OUTPATIENT
Start: 2024-12-20 | End: 2024-12-21 | Stop reason: HOSPADM

## 2024-12-20 RX ORDER — HYDROMORPHONE HYDROCHLORIDE 1 MG/ML
0.2 INJECTION, SOLUTION INTRAMUSCULAR; INTRAVENOUS; SUBCUTANEOUS
Status: DISCONTINUED | OUTPATIENT
Start: 2024-12-20 | End: 2024-12-20 | Stop reason: HOSPADM

## 2024-12-20 RX ORDER — DEXAMETHASONE SODIUM PHOSPHATE 4 MG/ML
INJECTION, SOLUTION INTRA-ARTICULAR; INTRALESIONAL; INTRAMUSCULAR; INTRAVENOUS; SOFT TISSUE PRN
Status: DISCONTINUED | OUTPATIENT
Start: 2024-12-20 | End: 2024-12-20 | Stop reason: SURG

## 2024-12-20 RX ORDER — PROCHLORPERAZINE EDISYLATE 5 MG/ML
10 INJECTION INTRAMUSCULAR; INTRAVENOUS EVERY 6 HOURS PRN
Status: DISCONTINUED | OUTPATIENT
Start: 2024-12-20 | End: 2024-12-21 | Stop reason: HOSPADM

## 2024-12-20 RX ORDER — ACETAMINOPHEN 500 MG
1000 TABLET ORAL ONCE
Status: DISCONTINUED | OUTPATIENT
Start: 2024-12-20 | End: 2024-12-20 | Stop reason: HOSPADM

## 2024-12-20 RX ORDER — DIPHENHYDRAMINE HYDROCHLORIDE 50 MG/ML
12.5 INJECTION INTRAMUSCULAR; INTRAVENOUS
Status: DISCONTINUED | OUTPATIENT
Start: 2024-12-20 | End: 2024-12-20 | Stop reason: HOSPADM

## 2024-12-20 RX ORDER — MIDAZOLAM HYDROCHLORIDE 1 MG/ML
INJECTION INTRAMUSCULAR; INTRAVENOUS PRN
Status: DISCONTINUED | OUTPATIENT
Start: 2024-12-20 | End: 2024-12-20 | Stop reason: SURG

## 2024-12-20 RX ORDER — CEFOTETAN DISODIUM 2 G/20ML
INJECTION, POWDER, FOR SOLUTION INTRAMUSCULAR; INTRAVENOUS PRN
Status: DISCONTINUED | OUTPATIENT
Start: 2024-12-20 | End: 2024-12-20 | Stop reason: SURG

## 2024-12-20 RX ORDER — HYDROMORPHONE HYDROCHLORIDE 1 MG/ML
0.4 INJECTION, SOLUTION INTRAMUSCULAR; INTRAVENOUS; SUBCUTANEOUS
Status: DISCONTINUED | OUTPATIENT
Start: 2024-12-20 | End: 2024-12-20 | Stop reason: HOSPADM

## 2024-12-20 RX ORDER — OXYCODONE HYDROCHLORIDE 5 MG/1
5 TABLET ORAL EVERY 4 HOURS PRN
Status: DISCONTINUED | OUTPATIENT
Start: 2024-12-20 | End: 2024-12-21 | Stop reason: HOSPADM

## 2024-12-20 RX ORDER — ONDANSETRON 2 MG/ML
INJECTION INTRAMUSCULAR; INTRAVENOUS PRN
Status: DISCONTINUED | OUTPATIENT
Start: 2024-12-20 | End: 2024-12-20 | Stop reason: SURG

## 2024-12-20 RX ORDER — ONDANSETRON 4 MG/1
4 TABLET, ORALLY DISINTEGRATING ORAL EVERY 6 HOURS PRN
Status: DISCONTINUED | OUTPATIENT
Start: 2024-12-20 | End: 2024-12-21 | Stop reason: HOSPADM

## 2024-12-20 RX ORDER — ACETAMINOPHEN 325 MG/1
650 TABLET ORAL EVERY 4 HOURS PRN
Status: DISCONTINUED | OUTPATIENT
Start: 2024-12-20 | End: 2024-12-21 | Stop reason: HOSPADM

## 2024-12-20 RX ADMIN — HYDROMORPHONE HYDROCHLORIDE 0.5 MG: 2 INJECTION INTRAMUSCULAR; INTRAVENOUS; SUBCUTANEOUS at 15:48

## 2024-12-20 RX ADMIN — SUGAMMADEX 200 MG: 100 INJECTION, SOLUTION INTRAVENOUS at 18:09

## 2024-12-20 RX ADMIN — OXYCODONE HYDROCHLORIDE 10 MG: 5 SOLUTION ORAL at 19:26

## 2024-12-20 RX ADMIN — CEFOTETAN DISODIUM 2 G: 2 INJECTION, POWDER, FOR SOLUTION INTRAMUSCULAR; INTRAVENOUS at 15:38

## 2024-12-20 RX ADMIN — SODIUM CHLORIDE, SODIUM LACTATE, POTASSIUM CHLORIDE, AND CALCIUM CHLORIDE 1000 ML: .6; .31; .03; .02 INJECTION, SOLUTION INTRAVENOUS at 18:36

## 2024-12-20 RX ADMIN — ROCURONIUM BROMIDE 50 MG: 50 INJECTION, SOLUTION INTRAVENOUS at 15:38

## 2024-12-20 RX ADMIN — MIDAZOLAM HYDROCHLORIDE 2 MG: 1 INJECTION, SOLUTION INTRAMUSCULAR; INTRAVENOUS at 15:38

## 2024-12-20 RX ADMIN — EPHEDRINE SULFATE 10 MG: 50 INJECTION, SOLUTION INTRAVENOUS at 16:52

## 2024-12-20 RX ADMIN — LIDOCAINE HYDROCHLORIDE 100 MG: 20 INJECTION, SOLUTION EPIDURAL; INFILTRATION; INTRACAUDAL; PERINEURAL at 15:38

## 2024-12-20 RX ADMIN — GLYCOPYRROLATE 0.3 MG: 0.2 INJECTION INTRAMUSCULAR; INTRAVENOUS at 16:23

## 2024-12-20 RX ADMIN — HYDROMORPHONE HYDROCHLORIDE 0.5 MG: 2 INJECTION INTRAMUSCULAR; INTRAVENOUS; SUBCUTANEOUS at 18:04

## 2024-12-20 RX ADMIN — FENTANYL CITRATE 100 MCG: 50 INJECTION, SOLUTION INTRAMUSCULAR; INTRAVENOUS at 15:38

## 2024-12-20 RX ADMIN — SODIUM CHLORIDE, POTASSIUM CHLORIDE, SODIUM LACTATE AND CALCIUM CHLORIDE: 600; 310; 30; 20 INJECTION, SOLUTION INTRAVENOUS at 15:31

## 2024-12-20 RX ADMIN — PROPOFOL 150 MG: 10 INJECTION, EMULSION INTRAVENOUS at 15:38

## 2024-12-20 RX ADMIN — ONDANSETRON 4 MG: 2 INJECTION INTRAMUSCULAR; INTRAVENOUS at 15:38

## 2024-12-20 RX ADMIN — DEXAMETHASONE SODIUM PHOSPHATE 4 MG: 4 INJECTION INTRA-ARTICULAR; INTRALESIONAL; INTRAMUSCULAR; INTRAVENOUS; SOFT TISSUE at 15:38

## 2024-12-20 ASSESSMENT — PAIN DESCRIPTION - PAIN TYPE
TYPE: SURGICAL PAIN
TYPE: ACUTE PAIN
TYPE: SURGICAL PAIN

## 2024-12-20 ASSESSMENT — LIFESTYLE VARIABLES
EVER HAD A DRINK FIRST THING IN THE MORNING TO STEADY YOUR NERVES TO GET RID OF A HANGOVER: NO
HAVE PEOPLE ANNOYED YOU BY CRITICIZING YOUR DRINKING: NO
HAVE YOU EVER FELT YOU SHOULD CUT DOWN ON YOUR DRINKING: NO
DOES PATIENT WANT TO STOP DRINKING: NO
CONSUMPTION TOTAL: NEGATIVE
HOW MANY TIMES IN THE PAST YEAR HAVE YOU HAD 5 OR MORE DRINKS IN A DAY: 0
TOTAL SCORE: 0
ON A TYPICAL DAY WHEN YOU DRINK ALCOHOL HOW MANY DRINKS DO YOU HAVE: 0
EVER FELT BAD OR GUILTY ABOUT YOUR DRINKING: NO
TOTAL SCORE: 0
TOTAL SCORE: 0
ALCOHOL_USE: NO
AVERAGE NUMBER OF DAYS PER WEEK YOU HAVE A DRINK CONTAINING ALCOHOL: 0

## 2024-12-20 ASSESSMENT — SOCIAL DETERMINANTS OF HEALTH (SDOH)
WITHIN THE LAST YEAR, HAVE YOU BEEN HUMILIATED OR EMOTIONALLY ABUSED IN OTHER WAYS BY YOUR PARTNER OR EX-PARTNER?: NO
WITHIN THE PAST 12 MONTHS, YOU WORRIED THAT YOUR FOOD WOULD RUN OUT BEFORE YOU GOT THE MONEY TO BUY MORE: NEVER TRUE
WITHIN THE LAST YEAR, HAVE TO BEEN RAPED OR FORCED TO HAVE ANY KIND OF SEXUAL ACTIVITY BY YOUR PARTNER OR EX-PARTNER?: NO
WITHIN THE LAST YEAR, HAVE YOU BEEN HUMILIATED OR EMOTIONALLY ABUSED IN OTHER WAYS BY YOUR PARTNER OR EX-PARTNER?: NO
WITHIN THE LAST YEAR, HAVE YOU BEEN AFRAID OF YOUR PARTNER OR EX-PARTNER?: NO
WITHIN THE LAST YEAR, HAVE YOU BEEN KICKED, HIT, SLAPPED, OR OTHERWISE PHYSICALLY HURT BY YOUR PARTNER OR EX-PARTNER?: NO
IN THE PAST 12 MONTHS, HAS THE ELECTRIC, GAS, OIL, OR WATER COMPANY THREATENED TO SHUT OFF SERVICE IN YOUR HOME?: NO
WITHIN THE PAST 12 MONTHS, THE FOOD YOU BOUGHT JUST DIDN'T LAST AND YOU DIDN'T HAVE MONEY TO GET MORE: NEVER TRUE
WITHIN THE LAST YEAR, HAVE YOU BEEN KICKED, HIT, SLAPPED, OR OTHERWISE PHYSICALLY HURT BY YOUR PARTNER OR EX-PARTNER?: NO
WITHIN THE LAST YEAR, HAVE TO BEEN RAPED OR FORCED TO HAVE ANY KIND OF SEXUAL ACTIVITY BY YOUR PARTNER OR EX-PARTNER?: NO
WITHIN THE LAST YEAR, HAVE YOU BEEN AFRAID OF YOUR PARTNER OR EX-PARTNER?: NO

## 2024-12-20 ASSESSMENT — FIBROSIS 4 INDEX
FIB4 SCORE: 0.83
FIB4 SCORE: 0.83

## 2024-12-20 ASSESSMENT — COGNITIVE AND FUNCTIONAL STATUS - GENERAL
MOBILITY SCORE: 24
DAILY ACTIVITIY SCORE: 24
SUGGESTED CMS G CODE MODIFIER DAILY ACTIVITY: CH
SUGGESTED CMS G CODE MODIFIER MOBILITY: CH

## 2024-12-20 ASSESSMENT — PATIENT HEALTH QUESTIONNAIRE - PHQ9
1. LITTLE INTEREST OR PLEASURE IN DOING THINGS: NOT AT ALL
2. FEELING DOWN, DEPRESSED, IRRITABLE, OR HOPELESS: NOT AT ALL
SUM OF ALL RESPONSES TO PHQ9 QUESTIONS 1 AND 2: 0

## 2024-12-20 ASSESSMENT — PAIN SCALES - GENERAL: PAIN_LEVEL: 1

## 2024-12-20 NOTE — PROGRESS NOTES
Pharmacy Medication Reconciliation      ~Medication reconciliation updated and complete per patient   ~Allergies have been verified and updated   ~No oral ABX within the last 30 days  ~Patient home pharmacy :  [facility] [phone]      ~Anticoagulants (rivaroxaban, apixaban, edoxaban, dabigatran, warfarin, enoxaparin) taken in the last 14 days? No

## 2024-12-20 NOTE — ANESTHESIA PREPROCEDURE EVALUATION
Case: [id number] Date/Time: 12/20/24 1315    Procedures:       ROBOTIC ASSISTED TOTAL LAPAROSCOPIC HYSTERECTOMY, RIGHT AND OR LEFT SALPINGO OOPHORECTOMY, POSSIBLE STAGING, POSSIBLE EXPLORATORY LAPAROTOMY      LYMPHADENECTOMY, ROBOT-ASSISTED, USING DV5      LAPAROTOMY, EXPLORATORY    Pre-op diagnosis: PELVIC MASS    Location: [facility] OR 17 / SURGERY [facility]    Surgeons: Giovani Salazar M.D.            Relevant Problems   CARDIAC   (positive) Acute deep vein thrombosis (DVT) of femoral vein of both lower extremities (HCC)       Physical Exam    Airway   Mallampati: II  TM distance: >3 FB  Neck ROM: full       Cardiovascular - normal exam  Rhythm: regular  Rate: normal  (-) murmur     Dental - normal exam           Pulmonary - normal exam  Breath sounds clear to auscultation     Abdominal    Neurological - normal exam                   Anesthesia Plan    ASA 2       Plan - general       Airway plan will be ETT          Induction: intravenous    Postoperative Plan: Postoperative administration of opioids is intended.    Pertinent diagnostic labs and testing reviewed    Informed Consent:    Anesthetic plan and risks discussed with patient.    Use of blood products discussed with: patient whom consented to blood products.

## 2024-12-20 NOTE — ANESTHESIA PROCEDURE NOTES
Airway    Date/Time: 12/20/2024 3:38 PM    Performed by: Faheem Basurto M.D.  Authorized by: Faheem Basurto M.D.    Location:  OR  Urgency:  Elective  Indications for Airway Management:  Anesthesia      Spontaneous Ventilation: absent    Sedation Level:  Deep  Preoxygenated: Yes    Patient Position:  Sniffing  Final Airway Type:  Endotracheal airway  Final Endotracheal Airway:  ETT  Cuffed: Yes    Technique Used for Successful ETT Placement:  Direct laryngoscopy    Insertion Site:  Oral  Blade Type:  Michele  Laryngoscope Blade/Videolaryngoscope Blade Size:  2  ETT Size (mm):  7.0  Measured from:  Teeth  ETT to Teeth (cm):  22  Placement Verified by: auscultation and capnometry    Cormack-Lehane Classification:  Grade I - full view of glottis  Number of Attempts at Approach:  1

## 2024-12-21 VITALS
RESPIRATION RATE: 18 BRPM | WEIGHT: 138.23 LBS | BODY MASS INDEX: 26.1 KG/M2 | HEIGHT: 61 IN | OXYGEN SATURATION: 96 % | HEART RATE: 92 BPM | SYSTOLIC BLOOD PRESSURE: 106 MMHG | DIASTOLIC BLOOD PRESSURE: 58 MMHG | TEMPERATURE: 97.2 F

## 2024-12-21 PROCEDURE — 770030 HCHG ROOM/CARE - EXTENDED RECOVERY EACH 15 MIN

## 2024-12-21 PROCEDURE — A9270 NON-COVERED ITEM OR SERVICE: HCPCS

## 2024-12-21 PROCEDURE — 700105 HCHG RX REV CODE 258

## 2024-12-21 PROCEDURE — G0378 HOSPITAL OBSERVATION PER HR: HCPCS

## 2024-12-21 PROCEDURE — 700102 HCHG RX REV CODE 250 W/ 637 OVERRIDE(OP)

## 2024-12-21 RX ORDER — SODIUM CHLORIDE 9 MG/ML
1000 INJECTION, SOLUTION INTRAVENOUS ONCE
Status: COMPLETED | OUTPATIENT
Start: 2024-12-21 | End: 2024-12-21

## 2024-12-21 RX ADMIN — SODIUM CHLORIDE 1000 ML: 0.9 INJECTION, SOLUTION INTRAVENOUS at 14:50

## 2024-12-21 RX ADMIN — IBUPROFEN 600 MG: 200 TABLET, FILM COATED ORAL at 17:14

## 2024-12-21 RX ADMIN — CARBOXYMETHYLCELLULOSE SODIUM 1 DROP: 5 SOLUTION/ DROPS OPHTHALMIC at 01:13

## 2024-12-21 RX ADMIN — CIPROFLOXACIN HYDROCHLORIDE 500 MG: 500 TABLET, FILM COATED ORAL at 17:14

## 2024-12-21 RX ADMIN — CIPROFLOXACIN HYDROCHLORIDE 500 MG: 500 TABLET, FILM COATED ORAL at 05:47

## 2024-12-21 RX ADMIN — IBUPROFEN 600 MG: 200 TABLET, FILM COATED ORAL at 04:38

## 2024-12-21 ASSESSMENT — PAIN DESCRIPTION - PAIN TYPE: TYPE: SURGICAL PAIN

## 2024-12-21 NOTE — PROGRESS NOTES
Attempted to ambulate, got to the edge of bed and patient was near syncopal with black vision and a spinning feeling. BP dropped to 97 systolic.     Laid patient back down, feeling subsided. BP rechecked and .    APRN notified.

## 2024-12-21 NOTE — OR NURSING
Patient resting comfortably, denies nausea. Patient daughter updated via phone. WAGENR Santos at bedside for assessment. VSS. Surgical dressings dry and intact, old drainage. Report provided to Kita Jacobson RN. Patient transported off unit with transport on room air @ 93%.

## 2024-12-21 NOTE — ANESTHESIA TIME REPORT
Anesthesia Start and Stop Event Times       Date Time Event    12/20/2024 1520 Ready for Procedure     1531 Anesthesia Start     1826 Anesthesia Stop          Responsible Staff  12/20/24      Name Role Begin End    Faheem Basurto M.D. Anesth 1531 1826          Overtime Reason:  no overtime (within assigned shift)    Comments:

## 2024-12-21 NOTE — DISCHARGE INSTRUCTIONS
HOME CARE INSTRUCTIONS    ACTIVITY: Rest and take it easy for the first 24 hours.  A responsible adult is recommended to remain with you during that time.  It is normal to feel sleepy.  We encourage you to not do anything that requires balance, judgment or coordination.    FOR 24 HOURS DO NOT:  Drive, operate machinery or run household appliances.  Drink beer or alcoholic beverages.  Make important decisions or sign legal documents.    SPECIAL INSTRUCTIONS:   For after your surgery:  1. No heavy lifting greater than 10 pounds for a minimum 6 weeks  2. Nothing in the vagina (ie no tampons, douching, intercourse) for a minimum of 6 weeks  3. No driving while taking narcotics  4. Call our office [phone] if you develop any fevers, chills, nausea/vomiting, heavy vaginal bleeding, or redness, tenderness, and/or  drainage from your wound, if you have persistent watery discharge while ambulating or stool draining from the vagina.  5. Showering with warm water is ok, no bathing or swimming  6. You may remove wound dressing on postoperative day 1, and place lose bandages for two weeks  7. You may have vaginal spotting or light bleeding which is normal.  8. If you have not had a bowel movement for 2 days, please take over the counter Milk of Magnesium, 1 tablespoon every 4 hours. After 4  doses and if you still have not had a bowel movement, please call your doctor.  9. You may eat a soft diet, such as soup, liquid, for day #1 and if tolerating you may resume your regular diet.  10. If you have had urinary stent(s) placed, please make arrangements to have removed 6 weeks after surgery.  11. If you have had a bowel resection, do no use suppositories.      DIET: To avoid nausea, slowly advance diet as tolerated, avoiding spicy or greasy foods for the first day.  Add more substantial food to your diet according to your physician's instructions.  Babies can be fed formula or breast milk as soon as they are hungry.  INCREASE  FLUIDS AND FIBER TO AVOID CONSTIPATION.    MEDICATIONS: Resume taking daily medication.  Take prescribed pain medication with food.  If no medication is prescribed, you may take non-aspirin pain medication if needed.  PAIN MEDICATION CAN BE VERY CONSTIPATING.  Take a stool softener or laxative such as senokot, pericolace, or milk of magnesia if needed.      Last pain medication given at Oxycodone at 7:10pm.    A follow-up appointment should be arranged with your doctor in 1-2 weeks; call to schedule.    You should CALL YOUR PHYSICIAN if you develop:  Fever greater than 101 degrees F.  Pain not relieved by medication, or persistent nausea or vomiting.  Excessive bleeding (blood soaking through dressing) or unexpected drainage from the wound.  Extreme redness or swelling around the incision site, drainage of pus or foul smelling drainage.  Inability to urinate or empty your bladder within 8 hours.  Problems with breathing or chest pain.    You should call 911 if you develop problems with breathing or chest pain.  If you are unable to contact your doctor or surgical center, you should go to the nearest emergency room or urgent care center.  Physician's telephone #:     MILD FLU-LIKE SYMPTOMS ARE NORMAL.  YOU MAY EXPERIENCE GENERALIZED MUSCLE ACHES, THROAT IRRITATION, HEADACHE AND/OR SOME NAUSEA.    If any questions arise, call your doctor.  If your doctor is not available, please feel free to call the Surgical Center at [phone]    .  The Center is open Monday through Friday from 7AM to 7PM.      A registered nurse may call you a few days after your surgery to see how you are doing after your procedure.    You may also receive a survey in the mail within the next two weeks and we ask that you take a few moments to complete the survey and return it to us.  Our goal is to provide you with very good care and we value your comments.     Depression / Suicide Risk    As you are discharged from this [facility]  facility, it is important to learn how to keep safe from harming yourself.    Recognize the warning signs:  Abrupt changes in personality, positive or negative- including increase in energy   Giving away possessions  Change in eating patterns- significant weight changes-  positive or negative  Change in sleeping patterns- unable to sleep or sleeping all the time   Unwillingness or inability to communicate  Depression  Unusual sadness, discouragement and loneliness  Talk of wanting to die  Neglect of personal appearance   Rebelliousness- reckless behavior  Withdrawal from people/activities they love  Confusion- inability to concentrate     If you or a loved one observes any of these behaviors or has concerns about self-harm, here's what you can do:  Talk about it- your feelings and reasons for harming yourself  Remove any means that you might use to hurt yourself (examples: pills, rope, extension cords, firearm)  Get professional help from the community (Mental Health, Substance Abuse, psychological counseling)  Do not be alone:Call your Safe Contact- someone whom you trust who will be there for you.  Call your local CRISIS HOTLINE 256-3684 or [phone]  Call your local Children's Mobile Crisis Response Team [place] (750) 970-0763 or www.[employer]  Call the toll free National Suicide Prevention Hotlines   National Suicide Prevention Lifeline 995-471-TGAY (6843)  National Hope Line Network 800-SUICIDE (510-4825)    I acknowledge receipt and understanding of these Home Care instructions.        Indwelling Urinary Catheter Care, Adult  An indwelling urinary catheter is a thin tube that is put into your bladder. The tube helps to drain pee (urine) out of your body. The tube goes in through your urethra. Your urethra is where pee comes out of your body. Your pee will come out through the catheter, then it will go into a bag (drainage bag).  Take good care of your catheter so it will work well.  What are  the risks?  Germs may get into your bladder and cause an infection.  The tube can become blocked.  Tissue near the catheter may become irritated and may bleed.  How to wear your catheter and drainage bag  Supplies needed  Sticky tape (adhesive tape) or a leg strap.  Alcohol wipe or soap and water (if you use tape).  A clean towel (if you use tape).  Large overnight bag.  Smaller bag (leg bag).  Wearing your catheter  Attach your catheter to your leg with tape or a leg strap.  Make sure the catheter is not pulled tight.  If a leg strap gets wet, take it off and put on a dry strap.  If you use tape to hold the bag on your leg:  Use an alcohol wipe or soap and water to wash your skin where the tape made it sticky before.  Use a clean towel to pat-dry that skin.  Use new tape to make the bag stay on your leg.  Wearing your bags  You should have been given a large overnight bag.  You may wear the overnight bag in the day or night.  Always have the overnight bag lower than your bladder.  Do not let the bag touch the floor.  Before you go to sleep, put a clean plastic bag in a wastebasket. Then, hang the overnight bag inside the wastebasket.  You should also have a smaller leg bag that fits under your clothes.  Wear the leg bag as told by the product maker. This may be above or below the knee, depending on the length of the tubing.  Make sure that the leg bag is below the bladder.  Make sure that the tubing does not have loops or too much tension.  Do not wear your leg bag at night.  How to care for your skin and catheter  Supplies needed  A clean washcloth.  Water and mild soap.  A clean towel.  Caring for your skin and catheter         Clean the skin around your catheter every day.  Wash your hands with soap and water.  Wet a clean washcloth in warm water and mild soap.  Clean the skin around your urethra.  If you are female:  Gently spread the folds of skin around your vagina (labia).  With the washcloth in your other  hand, wipe the inner side of your labia on each side. Wipe from front to back.  If you are male:  Pull back any skin that covers the end of your penis (foreskin).  With the washcloth in your other hand, wipe your penis in small circles. Start wiping at the tip of your penis, then move away from the catheter.  Move the foreskin back in place, if needed.  With your free hand, hold the catheter close to where it goes into your body.  Keep holding the catheter during cleaning so it does not get pulled out.  With the washcloth in your other hand, clean the catheter.  Only wipe downward on the catheter, toward the drainage bag.  Do not wipe upward toward your body. Doing this may push germs into your urethra and cause infection.  Use a clean towel to pat-dry the catheter and the skin around it. Make sure to wipe off all soap.  Wash your hands with soap and water.  Shower every day. Do not take baths.  Do not use cream, ointment, or lotion on the area where the catheter goes into your body, unless your doctor tells you to.  Do not use powders, sprays, or lotions on your genital area.  Check your skin around the catheter every day for signs of infection. Check for:  Redness, swelling, or pain.  Fluid or blood.  Warmth.  Pus or a bad smell.  How to empty the bag  Supplies needed  Rubbing alcohol.  Gauze pad or cotton ball.  Tape or a leg strap.  Emptying the bag  Pour the pee out of your bag when it is ?-½ full, or at least 2-3 times a day. Do this for your overnight bag and your leg bag.  Wash your hands with soap and water.  Separate (detach) the bag from your leg.  Hold the bag over the toilet or a clean pail. Keep the bag lower than your hips and bladder. This is so the pee (urine) does not go back into the tube.  Open the pour spout. It is at the bottom of the bag.  Empty the pee into the toilet or pail. Do not let the pour spout touch any surface.  Put rubbing alcohol on a gauze pad or cotton ball.  Use the gauze pad  or cotton ball to clean the pour spout.  Close the pour spout.  Attach the bag to your leg with tape or a leg strap.  Wash your hands with soap and water.  Follow instructions for cleaning the drainage bag. Instructions can come from:  The product maker.  Your doctor.  How to change the bag  Changing the bag  Replace your bag when it starts to leak, smell bad, or look dirty.  Wash your hands with soap and water.  Separate the dirty bag from your leg.  Pinch the catheter with your fingers so that pee does not spill out.  Separate the catheter tube from the bag tube where these tubes connect (at the connection valve). Do not let the tubes touch any surface.  Clean the end of the catheter tube with an alcohol wipe. Use a different alcohol wipe to clean the end of the bag tube.  Connect the catheter tube to the tube of the clean bag.  Attach the clean bag to your leg with tape or a leg strap. Do not make the bag tight on your leg.  Wash your hands with soap and water.  General instructions    Never pull on your catheter. Never try to take it out. Doing that can hurt you.  Always wash your hands before and after you touch your catheter or bag. Use a mild, fragrance-free soap. If you do not have soap and water, use hand .  Always make sure there are no twists, bends, or kinks in the catheter tube.  Always make sure there are no leaks in the catheter or bag.  Drink enough fluid to keep your pee pale yellow.  Do not take baths, swim, or use a hot tub.  If you are female, wipe from front to back after you poop (have a bowel movement).  Contact a doctor if:  Your catheter gets clogged.  Your catheter leaks.  You have signs of infection at the catheter site, such as:  Redness, swelling, or pain where the catheter goes into your body.  Fluid, blood, pus, or a bad smell coming from the area where the catheter goes into your body.  Skin feels warm where the catheter goes into your body.  You have signs of a bladder  infection, such as:  Fever.  Chills.  Pee smells worse than usual.  Cloudy pee.  Pain in your belly, legs, lower back, or bladder.  Vomiting or feel like vomiting.  Get help right away if:  You see blood in the catheter.  Your pee is pink or red.  Your bladder feels full.  Your pee is not draining into the bag.  Your catheter gets pulled out.  Summary  An indwelling urinary catheter is a thin tube that is placed into the bladder to help drain pee (urine) out of the body.  The catheter is placed into the part of the body that drains pee from the bladder (urethra).  Taking good care of your catheter will keep it working well.  Always wash your hands before and after touching your catheter or bag.  Never pull on your catheter or try to take it out.  This information is not intended to replace advice given to you by your health care provider. Make sure you discuss any questions you have with your health care provider.  Document Revised: 08/18/2022 Document Reviewed: 08/18/2022  [employer] Patient Education © 2023 [employer]

## 2024-12-21 NOTE — DISCHARGE SUMMARY
Discharge Summary    CHIEF COMPLAINT ON ADMISSION  No chief complaint on file.      Reason for Admission  Pelvic mass     Admission Date  2024    CODE STATUS  Full Code    HPI & HOSPITAL COURSE  Ms. Hills is a pleasant 49 year old  female whose LMP was 2024. She has been under the care of Dr. Cox for pelvic pain, heavy menstrual bleeding and uterine fibroid. She underwent a pelvic US on 24 that showed an enlarged uterus  measuring 11.8 x 7.9 x 5.5 cm with a normal endometrial stripe at 0.7 cm. Posterior intramural fibroid measuring 3.1 x 2.8 cm, two lower uterine intramural fibroids measuring 1.9 and 2.2 cm in diameter. An intramural fibroid on the left body of the uterus measuring 3.3 cm. Two subserosal fibroids on the posterior body of the uterus measuring 2.5 cm. R ovary showed a mildly complex cyst  measuring 2.6 cm with no solid components and internal daughter cyst. R ovary is not visualized, however, there were no left adnexal masses. Her recent pelvic US showed large complex pelvic mass characterized by sold and fluid components. Due to large  size, the mass could not be accurately measured. Due to these findings, she was referred to Dr. Salazar for further evaluation.      24: CT abd/pelvis large heterogeneous pelvic mass with fluid density and solid components extends into lower abdomen, size  [id number] cm in size, either ovarian or uterine origin, mild right and moderate left hydronephrosis secondary to compression. LE extremity dopplers were negative for DVT.    She was seen for consultation 2024.  She was admitted 2024 and underwent robotic assisted radical hysterectomy with bilateral salpingo-oophorectomy, appendectomy, cystotomy with repair and right ureteral stent placement.  Patient with slow to wake after anesthesia this she was kept overnight for extended recovery.  As of this morning her pain is well-controlled, she denies nausea/vomiting.  I reviewed with her  the indication for keeping her Shafer in place for 3 weeks and obtaining a cystogram prior to removal to ensure her bladder has healed.  I also reviewed that her ureteral stent will need to be removed in approximately 6 to 8 weeks.  She has been instructed to push oral fluids in order to keep her ureteral stent patent.  Given extensive urologic surgery she has been started on prophylactic ciprofloxacin.  A prescription for this antibiotic has been sent to her pharmacy and I reviewed indication for this antibiotic with patient and her  at bedside.  Additionally I reviewed postoperative activity restrictions including pelvic rest and lifting restriction of 10 pounds were also reviewed with patient.  I discussed that final pathology will take approximately 1 to 2 weeks to return.  She had a near syncopal episode upon ambulating inially post-op and was given a fluid bolus. She improved with fluids and rest and was able to ambulate this afternoon.  Vital signs are stable.  She has been advised to change positions slowly.     She is discharged in stable condition with close outpatient follow-up.    No notes on file    Therefore, she is discharged in good and stable condition to home with close outpatient follow-up.    The patient recovered much more quickly than anticipated on admission.    Discharge Date  12/21/2024    FOLLOW UP ITEMS POST DISCHARGE  Obtain cystogram 3 weeks postop and follow-up with [facility] for Shafer removal.  Our office will contact you to arrange imaging and Shafer removal.    DISCHARGE DIAGNOSES  Principal Problem:    Pelvic mass (POA: Yes)  Resolved Problems:  Pelvic mass      FOLLOW UP  No future appointments.  [facility]  [address] [place] [address]  [phone]  Follow up in 3 week(s)  For Shafer removal      MEDICATIONS ON DISCHARGE     Medication List        CONTINUE taking these medications        Instructions   Gas-X Extra Strength 125 MG chewable  tablet  Generic drug: simethicone   Chew 125 mg 1 time a day as needed for Flatulence.  Dose: 125 mg     ibuprofen 800 MG Tabs  Commonly known as: Motrin   Take 800 mg by mouth every 8 hours as needed for Moderate Pain.  Dose: 800 mg     TUMS PO   Take 2 Tablets by mouth 1 time a day as needed (Indigestion).  Dose: 2 Tablet            STOP taking these medications      oxyCODONE immediate-release 5 MG Tabs  Commonly known as: Roxicodone              Allergies  No Known Allergies    DIET  Orders Placed This Encounter   Procedures    Diet Order Diet: Regular     Standing Status:   Standing     Number of Occurrences:   1     Order Specific Question:   Diet:     Answer:   Regular [1]       ACTIVITY  For after your surgery:  1. No heavy lifting greater than 10 pounds for a minimum 6 weeks  2. Nothing in the vagina (ie no tampons, douching, intercourse) for a minimum of 6 weeks  3. No driving while taking narcotics  4. Call our office [phone] if you develop any fevers, chills, nausea/vomiting, heavy vaginal bleeding, or redness, tenderness, and/or  drainage from your wound, if you have persistent watery discharge while ambulating or stool draining from the vagina.  5. Showering with warm water is ok, no bathing or swimming  6. You may remove wound dressing on postoperative day 1, and place lose bandages for two weeks  7. You may have vaginal spotting or light bleeding which is normal.  8. If you have not had a bowel movement for 2 days, please take over the counter Milk of Magnesium, 1 tablespoon every 4 hours. After 4  doses and if you still have not had a bowel movement, please call your doctor.  9. You may eat a soft diet, such as soup, liquid, for day #1 and if tolerating you may resume your regular diet.  10. If you have had urinary stent(s) placed, please make arrangements to have removed 6 weeks after surgery.  11. If you have had a bowel resection, do no use suppositories.       CONSULTATIONS  N/a    PROCEDURES  Robotic assisted radical hysterectomy with bilateral salpingo-oophorectomy, appendectomy, cystotomy with repair and right ureteral stent placement.    LABORATORY  Lab Results   Component Value Date    SODIUM 139 12/17/2024    POTASSIUM 3.7 12/17/2024    CHLORIDE 105 12/17/2024    CO2 24 12/17/2024    GLUCOSE 94 12/17/2024    BUN 11 12/17/2024    CREATININE 0.59 12/17/2024        Lab Results   Component Value Date    WBC 6.3 12/17/2024    HEMOGLOBIN 9.9 (L) 12/17/2024    HEMATOCRIT 33.6 (L) 12/17/2024    PLATELETCT 447 (H) 12/17/2024        Total time of the discharge process exceeds 10  minutes.

## 2024-12-21 NOTE — ANESTHESIA POSTPROCEDURE EVALUATION
Patient: Camila Hills    Procedure Summary       Date: 12/20/24 Room / Location: [place] [address] / SURGERY [facility]    Anesthesia Start: 1531 Anesthesia Stop: 1826    Procedures:       ROBOTIC ASSISTED RADICAL HYSTERECTOMY, RIGHT AND LEFT SALPINGO OOPHORECTOMY, (Pelvis)      CYSTOSCOPY, WITH RIGHT URETERAL STENT INSERTION (Ureter) Diagnosis: (PELVIC MASS)    Surgeons: Giovani Salazar M.D. Responsible Provider: Faheem Basurto M.D.    Anesthesia Type: general ASA Status: 2            Final Anesthesia Type: general  Last vitals  BP   Blood Pressure: (!) 148/84    Temp   36.2 °C (97.2 °F)    Pulse   82   Resp   18    SpO2   99 %      Anesthesia Post Evaluation    Patient location during evaluation: PACU  Patient participation: complete - patient participated  Level of consciousness: awake and alert  Pain score: 1    Airway patency: patent  Anesthetic complications: no  Cardiovascular status: hemodynamically stable  Respiratory status: acceptable  Hydration status: euvolemic    PONV: none          There were no known notable events for this encounter.     Nurse Pain Score: 0 (NPRS)

## 2024-12-21 NOTE — CARE PLAN
The patient is Stable - Low risk of patient condition declining or worsening    Shift Goals  Clinical Goals: Pain and nausea control, ambulate, go home  Patient Goals: Feel better, eat, go home  Family Goals: Feel better, eat, go home    Progress made toward(s) clinical / shift goals:    Problem: Knowledge Deficit - Standard  Goal: Patient and family/care givers will demonstrate understanding of plan of care, disease process/condition, diagnostic tests and medications  Outcome: Progressing     Problem: Extended Recovery Optimal Care  Goal: Patient will achieve/maintain normal respiratory rate/effort  Outcome: Progressing  Goal: Alleviation or reduction of pain post surgery  Outcome: Progressing     Patient is not progressing towards the following goals:      Problem: Extended Recovery Optimal Care  Goal: Early mobilization post surgery  Outcome: Not Progressing

## 2024-12-22 NOTE — PROGRESS NOTES
DC instructions reviewed w/ pt, verbalized understanding. PIV dc'd, armband removed. Home w/ pedro cath w/ leg bag and big bag. Pt acknowledge instructions given by bedside RN on pedro cath care and emptying.

## 2024-12-23 LAB — PATHOLOGY CONSULT NOTE: NORMAL

## 2025-01-09 ENCOUNTER — HOSPITAL ENCOUNTER (OUTPATIENT)
Dept: RADIOLOGY | Facility: MEDICAL CENTER | Age: 50
End: 2025-01-09
Payer: COMMERCIAL

## 2025-01-09 DIAGNOSIS — N13.30 HYDRONEPHROSIS, BILATERAL: ICD-10-CM

## 2025-01-09 DIAGNOSIS — N39.0 URINARY TRACT INFECTION AFTER IMMOBILITY: ICD-10-CM

## 2025-01-09 DIAGNOSIS — R26.9 URINARY TRACT INFECTION AFTER IMMOBILITY: ICD-10-CM

## 2025-01-09 DIAGNOSIS — R10.2 PELVIC PAIN IN FEMALE: ICD-10-CM

## 2025-01-09 DIAGNOSIS — R19.00 PELVIC SWELLING: ICD-10-CM

## 2025-01-09 DIAGNOSIS — G89.18 POST-OPERATIVE PAIN: ICD-10-CM

## 2025-01-09 PROCEDURE — 51600 INJECTION FOR BLADDER X-RAY: CPT

## 2025-01-09 PROCEDURE — 700117 HCHG RX CONTRAST REV CODE 255

## 2025-01-09 RX ADMIN — IOHEXOL 50 ML: 240 INJECTION, SOLUTION INTRATHECAL; INTRAVASCULAR; INTRAVENOUS; ORAL at 12:24

## 2025-01-09 RX ADMIN — IOHEXOL 100 ML: 240 INJECTION, SOLUTION INTRATHECAL; INTRAVASCULAR; INTRAVENOUS; ORAL at 12:24

## 2025-01-14 ENCOUNTER — HOSPITAL ENCOUNTER (OUTPATIENT)
Dept: LAB | Facility: MEDICAL CENTER | Age: 50
End: 2025-01-14
Attending: STUDENT IN AN ORGANIZED HEALTH CARE EDUCATION/TRAINING PROGRAM
Payer: COMMERCIAL

## 2025-01-14 LAB
ANION GAP SERPL CALC-SCNC: 12 MMOL/L (ref 7–16)
BUN SERPL-MCNC: 11 MG/DL (ref 8–22)
CALCIUM SERPL-MCNC: 9.3 MG/DL (ref 8.5–10.5)
CHLORIDE SERPL-SCNC: 105 MMOL/L (ref 96–112)
CO2 SERPL-SCNC: 27 MMOL/L (ref 20–33)
CREAT SERPL-MCNC: 0.64 MG/DL (ref 0.5–1.4)
GFR SERPLBLD CREATININE-BSD FMLA CKD-EPI: 109 ML/MIN/1.73 M 2
GLUCOSE SERPL-MCNC: 103 MG/DL (ref 65–99)
POTASSIUM SERPL-SCNC: 3.9 MMOL/L (ref 3.6–5.5)
SODIUM SERPL-SCNC: 144 MMOL/L (ref 135–145)

## 2025-01-14 PROCEDURE — 36415 COLL VENOUS BLD VENIPUNCTURE: CPT

## 2025-01-14 PROCEDURE — 80048 BASIC METABOLIC PNL TOTAL CA: CPT

## 2025-01-15 NOTE — OP REPORT
PreOp Diagnosis: 20 weeks leiomyomatous uterus      PostOp Diagnosis: Same as above rule out endometrial stromal sarcoma      Procedure(s):  ROBOTIC ASSISTED RADICAL HYSTERECTOMY, RIGHT AND LEFT SALPINGO OOPHORECTOMY, - Wound Class: Clean Contaminated  CYSTOSCOPY, WITH RIGHT URETERAL STENT INSERTION - Wound Class: Clean Contaminated    Surgeon(s):  Giovani Salazar M.D.    Anesthesiologist/Type of Anesthesia:  Anesthesiologist: Faheem Basurto M.D./General    Surgical Staff:  Circulator: Lisseth Salgado R.N.  Scrub Person: Magi Hudson  First Assist: BHUPINDER Harding    Specimens removed if any:  ID Type Source Tests Collected by Time Destination   A : UTERUS, CERVIX, BILATERAL FALLOPIAN TUBES AND OVARIES Tissue Uterus PATHOLOGY SPECIMEN Giovani Salazar M.D. 12/20/2024  5:05 PM    B : Appendix Tissue Appendix PATHOLOGY SPECIMEN Giovani Salazar M.D. 12/20/2024  5:32 PM    C : PELVIC WASHINGS Other Other PATHOLOGY SPECIMEN Giovani Salazar M.D. 12/20/2024  5:31 PM        Estimated Blood Loss: 50 cc    Findings: No evidence of ascites.  Normal right left diaphragm.  Liver capsule smooth.  Stomach appear grossly normal peer abdominal peritoneal surfaces were unremarkable.  Omentum or vertical sleeve normal.    In the pelvis uterus was quite large.  The uterus appears to be quite globular.  The fibroid extended down to the lower uterine segment to the extent that the ureter had been completely displaced almost to the right pelvic sidewall likewise the left ureter was the same.  This necessitated a radical dissection with unroofing of the ureters and performing a radical hysterectomy safely so that we would be able to perform a hysterectomy in a safe manner.  Due to the significant dissection that was necessary to perform on the ureters we checked the integrity of the ureters.  The left ureter was quite healthy the right ureter however appeared somewhat compromised from the significant dissection although there was no obvious  injury I elected to place a right ureteral stent to allow for the right ureter to heal.    The dissection required a type B modified radical hysterectomy to complete the hysterectomy.  During the dissection of the bladder the posterior wall of the bladder in the midportion of the bladder above the trigone there was weakening of the bladder wall that was noted.  I had to reinforce this.  There was no through and through defect that was noted.  I reinforced the detrusor muscle in 2 layers.  After completion of this we had ensured that there was no evidence of a leak.    Complications: None    Indication: Ms. Hills is a pleasant 49 year old  female whose LMP was 2024. She has been under the care of Dr. Cox for pelvic pain, heavy menstrual bleeding and uterine fibroid. She underwent a pelvic US on 24 that showed an enlarged uterus measuring 11.8 x 7.9 x 5.5 cm with a normal endometrial stripe at 0.7 cm. Posterior intramural fibroid measuring 3.1 x 2.8 cm, two  lower uterine intramural fibroids measuring 1.9 and 2.2 cm in diameter. An intramural fibroid on the left body of the uterus measuring 3.3 cm. Two subserosal fibroids on the posterior body of the uterus measuring 2.5 cm. R ovary showed a mildly complex cyst measuring 2.6 cm with no solid components and internal daughter cyst. R ovary is not visualized, however there were no left adnexal masses. Her recent pelvic US showed large complex pelvic mass characterized by sold and fluid components. Due to large size, the mass could not be accurately measured. Due to these findings, she has been referred to me for further evaluation. 24: CT abd/pelvis large heterogeneous pelvic mass with fluid density and solid components extends into lower abdomen, size [id number] cm in size, either ovarian or uterine origin, mild right and moderate left hydronephrosis secondary to compression      Procedure:After achieving adequate general anesthesia, an  appropriate antibiotic was administered, the patient was prepped and draped in a sterile fashion. An audible timeout was performed with the surgical team and after everyone agreed we proceeded with our intended procedure.     Initially, began with the right posterior broadleaf ligament, this was   incised. The right ureter was identified. The right ovarian vessels were   skeletonized and isolated. Bipolar cautery was used to cauterize the ovarian   vessels and subsequently divided. The perirectal spaces were developed.  The round ligament was incised followed by the anterior broad leaf ligament. The uterovesicle fold was then incised to dissect the bladder away from the paracervical fascia.The perivesicular space was developed. The parametria were developed.  The attached ureter was dissected away from the medial leaf of the pelvic peritoneum along with the fibrofatty tissue along with the neurovascular bundle of the S2, S3, S4 nerves, and the mesoureter. The O'Kabayashi space was then developed. The deep uterine vein was skeletonized and isolated medial to the ureteric tunnel with the ureter clearly visualized as it entered the ureteric tunnel. The anterior division of hypogastric vessels was skeletonized along with the uterine artery and vein and the superior vesicle artery which were identified. Following completion of this, I then divided the uterine artery laterally to the ureter ensuring the ureter was not compromised. The EEA sizer in the vaginal canal was then pushed cephalad to mobilize the bladder away from the paracervical fascia while placing the ureter and the anterior uterovesicle ligament on traction. The anterior uterovesicle ligament was then skeletonized, elevated ventrally, and clamped, and divided without compromising the ureter. The ureter was then dissected laterally to expose the lower parametria and deep uterine vein. The deep uterine vein along with the dorsum of the uterovesicle ligament  was then isolated, skeletonized, clamped, cauterized via bipolar energy without compromising the ureter, and divided      After completion of this, I then turned my focus towards the left side. Again, similar dissection was performed on the contralateral side, first initially incising the left posterior broadleaf ligament. The left ureter was identified. The left ovarian vessels were skeletonized and isolated. Bipolar cautery was used to cauterize the ovarian vessels. The left round ligament followed by left anterior broadleaf ligament was divided. Perivesicular and perirectal spaces were developed and created. .  After completion of this, the left parametria was developed. The attached ureter was dissected away from the medial leaf of the pelvic peritoneum along with the fibrofatty tissue along with the neurovascular bundle of the S2, S3, S4 nerves, and the mesoureter. The O'Kabayashi space was then developed. The deep uterine vein was skeletonized and isolated medial to the ureteric tunnel with the ureter clearly visualized as it entered the ureteric tunnel. The anterior division of hypogastric vessels was skeletonized along with the uterine artery and vein and the superior vesicle artery which were identified. Following completion of this, I then divided the uterine artery laterally to the ureter ensuring the ureter was not compromised. The EEA sizer in the vaginal canal was then pushed cephalad to mobilize the bladder away from the paracervical fascia while placing the ureter and the anterior uterovesicle ligament on traction. The anterior uterovesicle ligament was then skeletonized, elevated ventrally, and clamped, and divided without compromising the ureter. The ureter was then dissected laterally to expose the lower parametria and deep uterine vein.   After completion of the deep uterine vein along with the dorsum of the uterovesicle ligament was then isolated, skeletonized, clamped, cauterized via bipolar  energy without compromising the ureter, and divided. The uterosacral ligaments were then skeletonized and isolated. The rectovaginal septum was then developed. After completion of this, the uterosacral ligament was divided with the vessel sealer. We further dissected the bladder away from paracervical fascia after at least 1 cm of the vaginal margin was obtained and anterior colpotomy was made. The vagina was circumferentially incised to complete the Type B nerve-sparing hysterectomy with bilateral salpingo-oophorectomy. The specimen was removed through the vaginal canal. The vaginal cuff was then closed with 0 Quill PDS suture in a 2-layer standard closure fashion.     I then inspected the integrity of the ureters the left ureter was completely normal the right ureter however significant dissection had to be performed as the fibroid extended out to the right side more so than the left side.  Thus I elected to place a right ureteral stent.  At this point in time I then turned my focus towards the perineum.  The Shafre catheter was removed.  30 degree cystoscope was inserted transurethrally.  With dual camera with the robotic endoscope with intraperitoneal visualization I then intubated the right ureteral orifice with a Glidewire.  We did not perforate the ureter which was confirmed by the robotic endoscope.  I then placed a 6 x 22 double-J ureteral stent after the Glidewire was placed appropriately.  The Glidewire was then withdrawn.  At the completion of this the posterior wall of the bladder was quite thinned out which was noted.  This was recognized thus I elected to reinforce this posterior wall of the bladder with 2-0 Vicryl suture 2 layer.  After completion of this I backfilled the bladder to ensure that there was no evidence of leak once this was assured I then copiously irrigated the pelvis with water. Hemostasis was established. The integrity of the ureters were intact. Once this was established, we counted  for sponges, needles, and instrument counts. Once this was accounted for, robotic instrumentation was removed. Robotic system was then de-docked. The 12-mm defect port was then closed with an EndoClose 0 Vicryl suture under direct laparoscopic visualization. Pneumoperitoneum was allowed to escape through the 8 mm port. The subcutaneous fat was copiously irrigated with water. The skin was reapproximated with 3-0 Monocryl sutures.    The patient tolerated the procedure without any difficulties and was extubated and transferred to the PACU in stable condition.

## 2025-01-15 NOTE — OR SURGEON
Immediate Post OP Note    PreOp Diagnosis: 20 weeks leiomyomatous uterus      PostOp Diagnosis: Same as above rule out endometrial stromal sarcoma      Procedure(s):  ROBOTIC ASSISTED RADICAL HYSTERECTOMY, RIGHT AND LEFT SALPINGO OOPHORECTOMY, - Wound Class: Clean Contaminated  CYSTOSCOPY, WITH RIGHT URETERAL STENT INSERTION - Wound Class: Clean Contaminated    Surgeon(s):  Giovani Salazar M.D.    Anesthesiologist/Type of Anesthesia:  Anesthesiologist: Faheem Basurto M.D./General    Surgical Staff:  Circulator: Lisseth Salgado R.N.  Scrub Person: Magi Hudson  First Assist: BHUPINDER Hrading    Specimens removed if any:  ID Type Source Tests Collected by Time Destination   A : UTERUS, CERVIX, BILATERAL FALLOPIAN TUBES AND OVARIES Tissue Uterus PATHOLOGY SPECIMEN Giovani Salazar M.D. 12/20/2024  5:05 PM    B : Appendix Tissue Appendix PATHOLOGY SPECIMEN Giovani Salazar M.D. 12/20/2024  5:32 PM    C : PELVIC WASHINGS Other Other PATHOLOGY SPECIMEN Giovani Salazar M.D. 12/20/2024  5:31 PM        Estimated Blood Loss: 50 cc    Findings: No evidence of ascites.  Normal right left diaphragm.  Liver capsule smooth.  Stomach appear grossly normal peer abdominal peritoneal surfaces were unremarkable.  Omentum or vertical sleeve normal.    In the pelvis uterus was quite large.  The uterus appears to be quite globular.  The fibroid extended down to the lower uterine segment to the extent that the ureter had been completely displaced almost to the right pelvic sidewall likewise the left ureter was the same.  This necessitated a radical dissection with unroofing of the ureters and performing a radical hysterectomy safely so that we would be able to perform a hysterectomy in a safe manner.  Due to the significant dissection that was necessary to perform on the ureters we checked the integrity of the ureters.  The left ureter was quite healthy the right ureter however appeared somewhat compromised from the significant dissection  although there was no obvious injury I elected to place a right ureteral stent to allow for the right ureter to heal.    The dissection required a type B modified radical hysterectomy to complete the hysterectomy.    Complications: None        1/14/2025 7:35 PM Giovani Salazar M.D.

## 2025-01-15 NOTE — PROGRESS NOTES
Patient had to be admitted overnight for an extended stay due to the fact the patient had undergone a robotic assisted radical hysterectomy with bilateral salpingo-oophorectomy and cystotomy repair with right ureteral stents.  She was kept overnight for observation.  Patient did well.

## 2025-02-05 ENCOUNTER — HOSPITAL ENCOUNTER (OUTPATIENT)
Facility: MEDICAL CENTER | Age: 50
End: 2025-02-05
Attending: SPECIALIST
Payer: COMMERCIAL

## 2025-02-05 PROCEDURE — 87186 SC STD MICRODIL/AGAR DIL: CPT | Mod: 91

## 2025-02-05 PROCEDURE — 87077 CULTURE AEROBIC IDENTIFY: CPT

## 2025-02-05 PROCEDURE — 87075 CULTR BACTERIA EXCEPT BLOOD: CPT

## 2025-02-05 PROCEDURE — 87205 SMEAR GRAM STAIN: CPT

## 2025-02-05 PROCEDURE — 87076 CULTURE ANAEROBE IDENT EACH: CPT | Mod: 91

## 2025-02-05 PROCEDURE — 87070 CULTURE OTHR SPECIMN AEROBIC: CPT

## 2025-02-06 LAB
GRAM STN SPEC: NORMAL
SIGNIFICANT IND 70042: NORMAL
SITE SITE: NORMAL
SOURCE SOURCE: NORMAL

## 2025-02-09 LAB
BACTERIA SPEC ANAEROBE CULT: ABNORMAL
BACTERIA SPEC ANAEROBE CULT: ABNORMAL
BACTERIA WND AEROBE CULT: ABNORMAL
GRAM STN SPEC: ABNORMAL
SIGNIFICANT IND 70042: ABNORMAL
SIGNIFICANT IND 70042: ABNORMAL
SITE SITE: ABNORMAL
SITE SITE: ABNORMAL
SOURCE SOURCE: ABNORMAL
SOURCE SOURCE: ABNORMAL

## 2025-02-11 ENCOUNTER — HOSPITAL ENCOUNTER (OUTPATIENT)
Dept: RADIOLOGY | Facility: MEDICAL CENTER | Age: 50
End: 2025-02-11
Payer: COMMERCIAL

## 2025-02-13 ENCOUNTER — HOSPITAL ENCOUNTER (OUTPATIENT)
Facility: MEDICAL CENTER | Age: 50
End: 2025-02-13
Attending: SPECIALIST
Payer: COMMERCIAL

## 2025-03-05 LAB — PATHOLOGY CONSULT NOTE: NORMAL

## 2025-05-15 ENCOUNTER — HOSPITAL ENCOUNTER (OUTPATIENT)
Dept: LAB | Facility: MEDICAL CENTER | Age: 50
End: 2025-05-15
Payer: COMMERCIAL

## 2025-05-15 LAB
BASOPHILS # BLD AUTO: 0.7 % (ref 0–1.8)
BASOPHILS # BLD: 0.04 K/UL (ref 0–0.12)
EOSINOPHIL # BLD AUTO: 0.16 K/UL (ref 0–0.51)
EOSINOPHIL NFR BLD: 2.9 % (ref 0–6.9)
ERYTHROCYTE [DISTWIDTH] IN BLOOD BY AUTOMATED COUNT: 51.6 FL (ref 35.9–50)
EST. AVERAGE GLUCOSE BLD GHB EST-MCNC: 126 MG/DL
HBA1C MFR BLD: 6 % (ref 4–5.6)
HCT VFR BLD AUTO: 43.5 % (ref 37–47)
HGB BLD-MCNC: 13.4 G/DL (ref 12–16)
IMM GRANULOCYTES # BLD AUTO: 0.02 K/UL (ref 0–0.11)
IMM GRANULOCYTES NFR BLD AUTO: 0.4 % (ref 0–0.9)
LYMPHOCYTES # BLD AUTO: 2.09 K/UL (ref 1–4.8)
LYMPHOCYTES NFR BLD: 37.4 % (ref 22–41)
MCH RBC QN AUTO: 27.2 PG (ref 27–33)
MCHC RBC AUTO-ENTMCNC: 30.8 G/DL (ref 32.2–35.5)
MCV RBC AUTO: 88.2 FL (ref 81.4–97.8)
MONOCYTES # BLD AUTO: 0.43 K/UL (ref 0–0.85)
MONOCYTES NFR BLD AUTO: 7.7 % (ref 0–13.4)
NEUTROPHILS # BLD AUTO: 2.85 K/UL (ref 1.82–7.42)
NEUTROPHILS NFR BLD: 50.9 % (ref 44–72)
NRBC # BLD AUTO: 0 K/UL
NRBC BLD-RTO: 0 /100 WBC (ref 0–0.2)
PLATELET # BLD AUTO: 310 K/UL (ref 164–446)
PMV BLD AUTO: 10.1 FL (ref 9–12.9)
RBC # BLD AUTO: 4.93 M/UL (ref 4.2–5.4)
WBC # BLD AUTO: 5.6 K/UL (ref 4.8–10.8)

## 2025-05-15 PROCEDURE — 80053 COMPREHEN METABOLIC PANEL: CPT

## 2025-05-15 PROCEDURE — 83036 HEMOGLOBIN GLYCOSYLATED A1C: CPT

## 2025-05-15 PROCEDURE — 85025 COMPLETE CBC W/AUTO DIFF WBC: CPT

## 2025-05-15 PROCEDURE — 84443 ASSAY THYROID STIM HORMONE: CPT

## 2025-05-15 PROCEDURE — 36415 COLL VENOUS BLD VENIPUNCTURE: CPT

## 2025-05-15 PROCEDURE — 83615 LACTATE (LD) (LDH) ENZYME: CPT

## 2025-05-15 PROCEDURE — 80061 LIPID PANEL: CPT

## 2025-05-16 LAB
ALBUMIN SERPL BCP-MCNC: 4.5 G/DL (ref 3.2–4.9)
ALBUMIN/GLOB SERPL: 1.4 G/DL
ALP SERPL-CCNC: 110 U/L (ref 30–99)
ALT SERPL-CCNC: 16 U/L (ref 2–50)
ANION GAP SERPL CALC-SCNC: 12 MMOL/L (ref 7–16)
AST SERPL-CCNC: 20 U/L (ref 12–45)
BILIRUB SERPL-MCNC: 0.6 MG/DL (ref 0.1–1.5)
BUN SERPL-MCNC: 18 MG/DL (ref 8–22)
CALCIUM ALBUM COR SERPL-MCNC: 9.5 MG/DL (ref 8.5–10.5)
CALCIUM SERPL-MCNC: 9.9 MG/DL (ref 8.5–10.5)
CHLORIDE SERPL-SCNC: 104 MMOL/L (ref 96–112)
CHOLEST SERPL-MCNC: 214 MG/DL (ref 100–199)
CO2 SERPL-SCNC: 27 MMOL/L (ref 20–33)
CREAT SERPL-MCNC: 0.66 MG/DL (ref 0.5–1.4)
FASTING STATUS PATIENT QL REPORTED: NORMAL
GFR SERPLBLD CREATININE-BSD FMLA CKD-EPI: 108 ML/MIN/1.73 M 2
GLOBULIN SER CALC-MCNC: 3.2 G/DL (ref 1.9–3.5)
GLUCOSE SERPL-MCNC: 105 MG/DL (ref 65–99)
HDLC SERPL-MCNC: 67 MG/DL
LDH SERPL L TO P-CCNC: 181 U/L (ref 107–266)
LDLC SERPL CALC-MCNC: 134 MG/DL
POTASSIUM SERPL-SCNC: 4.2 MMOL/L (ref 3.6–5.5)
PROT SERPL-MCNC: 7.7 G/DL (ref 6–8.2)
SODIUM SERPL-SCNC: 143 MMOL/L (ref 135–145)
TRIGL SERPL-MCNC: 65 MG/DL (ref 0–149)
TSH SERPL DL<=0.005 MIU/L-ACNC: 1.53 UIU/ML (ref 0.38–5.33)

## 2025-05-27 ENCOUNTER — HOSPITAL ENCOUNTER (OUTPATIENT)
Dept: RADIOLOGY | Facility: MEDICAL CENTER | Age: 50
End: 2025-05-27
Payer: COMMERCIAL

## 2025-05-27 DIAGNOSIS — E28.310 SYMPTOMATIC PREMATURE MENOPAUSE: ICD-10-CM

## 2025-05-27 DIAGNOSIS — N13.30 HYDROCALYCOSIS: ICD-10-CM

## 2025-05-27 DIAGNOSIS — N95.1 SYMPTOMATIC MENOPAUSAL OR FEMALE CLIMACTERIC STATES: ICD-10-CM

## 2025-05-27 DIAGNOSIS — N39.0 URINARY TRACT INFECTION AFTER IMMOBILITY: ICD-10-CM

## 2025-05-27 DIAGNOSIS — M89.9 BONE DISEASE: ICD-10-CM

## 2025-05-27 DIAGNOSIS — R07.89 CHEST WALL PAIN FOLLOWING SURGERY: ICD-10-CM

## 2025-05-27 DIAGNOSIS — G89.18 CHEST WALL PAIN FOLLOWING SURGERY: ICD-10-CM

## 2025-05-27 DIAGNOSIS — C54.1 ENDOMETRIAL SARCOMA (HCC): ICD-10-CM

## 2025-05-27 DIAGNOSIS — R10.2 PELVIC PAIN SYNDROME: ICD-10-CM

## 2025-05-27 DIAGNOSIS — Z79.811 USE OF AROMATASE INHIBITORS: ICD-10-CM

## 2025-05-27 DIAGNOSIS — R26.9 URINARY TRACT INFECTION AFTER IMMOBILITY: ICD-10-CM

## 2025-05-27 DIAGNOSIS — R19.00 ABDOMINAL SWELLING: ICD-10-CM

## 2025-05-27 DIAGNOSIS — R33.9 RETENTION OF URINE, UNSPECIFIED: ICD-10-CM

## 2025-05-27 PROCEDURE — 77080 DXA BONE DENSITY AXIAL: CPT

## 2025-07-23 ENCOUNTER — HOSPITAL ENCOUNTER (OUTPATIENT)
Dept: RADIOLOGY | Facility: MEDICAL CENTER | Age: 50
End: 2025-07-23
Payer: COMMERCIAL

## 2025-08-01 ENCOUNTER — APPOINTMENT (OUTPATIENT)
Dept: RADIOLOGY | Facility: MEDICAL CENTER | Age: 50
End: 2025-08-01
Attending: FAMILY MEDICINE
Payer: COMMERCIAL

## 2025-08-01 ASSESSMENT — FIBROSIS 4 INDEX: FIB4 SCORE: .7741935483870967742

## (undated) DEVICE — LACTATED RINGERS INJ 1000 ML - (14EA/CA 60CA/PF)

## (undated) DEVICE — DRAPE ARM BOX OF 20

## (undated) DEVICE — GLOVE BIOGEL PI ORTHO SZ 7.5 PF LF (40PR/BX)

## (undated) DEVICE — SUTURE GENERAL

## (undated) DEVICE — SOD. CHL. INJ. 0.9% 1000 ML - (14EA/CA 60CA/PF)

## (undated) DEVICE — PACK TRENGUARD 450 PROCEDURE (12EA/CA)

## (undated) DEVICE — DRAPE STRLE REG TOWEL 18X24 - (10/BX 4BX/CA)"

## (undated) DEVICE — BIPOLAR FORCE DA VINCI 12X'S REISABLE

## (undated) DEVICE — TUBE CONNECT SUCTION CLEAR 120 X 1/4" (50EA/CA)"

## (undated) DEVICE — FILTER BLOOD TRANSFUSION - (40/CA) (PALL)

## (undated) DEVICE — DRAPE UNDER BUTTOCKS FLUID - (20/CA)

## (undated) DEVICE — GOWN WARMING STANDARD FLEX - (30/CA)

## (undated) DEVICE — SUTURE 3-0 SILK SH 30 (36PK/BX)

## (undated) DEVICE — SUTURE QUILL 0 PDO 14X14 - (12/BX)

## (undated) DEVICE — DRESSING NON-ADHERING 8 X 3 - (50/BX)

## (undated) DEVICE — CORETEMP DRAPE FORM-FITTED EASY DROPANDGO DRAPE FOR USE ON THE CORETEMP FLUID MANAGEMENT 56IN X 56IN

## (undated) DEVICE — CLIP INTERNAL LIGATE TITANIUM MEDIUM WECK HORIZON (6EA/PK 30PK/BX)

## (undated) DEVICE — GLOVE COTTON STERILE (50/CA)

## (undated) DEVICE — PACK MAJOR BASIN - (2EA/CA)

## (undated) DEVICE — SUCTION INSTRUMENT YANKAUER BULBOUS TIP W/O VENT (50EA/CA)

## (undated) DEVICE — GOWN SURGEONS X-LARGE - DISP. (30/CA)

## (undated) DEVICE — SLEEVE, VASO, THIGH, MED

## (undated) DEVICE — CANISTER SUCTION 3000ML MECHANICAL FILTER AUTO SHUTOFF MEDI-VAC NONSTERILE LF DISP (40EA/CA)

## (undated) DEVICE — SLEEVE VASO DVT COMPRESSION CALF MED - (10PR/CA)

## (undated) DEVICE — SYRINGE 10 ML CONTROL LL (25EA/BX 4BX/CA)

## (undated) DEVICE — PACK GYN DAVINCI (1EA/CA)

## (undated) DEVICE — TRANSDUCER ADULT DISP. SINGLE BONDED STERILE - (10EA/CA)

## (undated) DEVICE — TUBING CLEARLINK DUO-VENT - C-FLO (48EA/CA)

## (undated) DEVICE — SUTURE 2-0 VICRYL PLUS SH - 27 INCH (36/BX)

## (undated) DEVICE — BOVIE BLADE 6 EXTENDED - (50/PK)

## (undated) DEVICE — LEGGING LITHOTOMY 31 X 48 IN - (2EA/PK 20PK/CA)

## (undated) DEVICE — TOWELS CLOTH SURGICAL - (4/PK 20PK/CA)

## (undated) DEVICE — DRESSING TRANSPARENT FILM TEGADERM 2.375 X 2.75" (100EA/BX)"

## (undated) DEVICE — CLIP HEMOLOCK PURPLE - (14/BX)

## (undated) DEVICE — GLOVE BIOGEL PI INDICATOR SZ 6.5 SURGICAL PF LF - (50/BX 4BX/CA)

## (undated) DEVICE — GLOVE SZ 6.5 BIOGEL PI MICRO - PF LF (50PR/BX)

## (undated) DEVICE — CLIP LG INTNL HRZN TI ESCP LGT - (20/BX)

## (undated) DEVICE — CANISTER SUCTION RIGID RED 1500CC (40EA/CA)

## (undated) DEVICE — NEEDLE DRIVER MEGA SUTURECUT DA VINCI 15X'S REUSABLE

## (undated) DEVICE — SEALER SYNCROSEAL ENERGY DAVINCI SINGLE USE (6EA/BX)

## (undated) DEVICE — ENDOLOOP 0 VICRYL - (12/BX)

## (undated) DEVICE — NEEDLE INSUFFLATION FOR STEP - (12/BX)

## (undated) DEVICE — ELECTRODE DUAL RETURN W/ CORD - (50/PK)

## (undated) DEVICE — SUTURE 3-0 MONOCRYL PLUS PS-1 - 27 INCH (36/BX)

## (undated) DEVICE — CLIP MED LG INTNL HRZN TI ESCP - (20/BX)

## (undated) DEVICE — GOWN SURGICAL X-LARGE ULTRA - FILM-REINFORCED (20/CA)

## (undated) DEVICE — TUBE NG SALEM SUMP 16FR (50EA/CA)

## (undated) DEVICE — SPONGE GAUZESTER 4 X 4 4PLY - (128PK/CA)

## (undated) DEVICE — COVER LIGHT HANDLE ALC PLUS DISP (18EA/BX)

## (undated) DEVICE — SENSOR OXIMETER ADULT SPO2 RD SET (20EA/BX)

## (undated) DEVICE — ARMREST CRADLE FOAM - (2PR/PK 12PR/CA)

## (undated) DEVICE — CHLORAPREP 26 ML APPLICATOR - ORANGE TINT(25/CA)

## (undated) DEVICE — COVER FOOT UNIVERSAL DISP. - (25EA/CA)

## (undated) DEVICE — DRAPE COLUMN BOX OF 20

## (undated) DEVICE — SYRINGE ASEPTO - (50EA/CA

## (undated) DEVICE — SEAL UNIVERSAL 5MM-12MM (10EA/BX)

## (undated) DEVICE — CATHETER URETHRAL OPEN END AXXCESS (10EA/BX)

## (undated) DEVICE — WIRE GUIDE SENSOR DUAL FLEX - 5/BX

## (undated) DEVICE — FORCEPS PROGRASP (18UN/EA)

## (undated) DEVICE — SODIUM CHL IRRIGATION 0.9% 1000ML (12EA/CA)

## (undated) DEVICE — TRAY SRGPRP PVP IOD WT PRP - (20/CA)

## (undated) DEVICE — SET LEADWIRE 5 LEAD BEDSIDE DISPOSABLE ECG (1SET OF 5/EA)

## (undated) DEVICE — PAD LAP STERILE 18 X 18 - (5/PK 40PK/CA)

## (undated) DEVICE — STAPLER SKIN DISP - (6/BX 10BX/CA) VISISTAT

## (undated) DEVICE — GLOVE SZ 6 BIOGEL PI MICRO - PF LF (50PR/BX 4BX/CA)

## (undated) DEVICE — SPATULA PERMANENT CAUTERY DA VINCI 10X'S REUSABLE

## (undated) DEVICE — TRAY, CV CATH MULTI-LUM.AK-25703

## (undated) DEVICE — SET EXTENSION WITH 2 PORTS (48EA/CA) ***PART #2C8610 IS A SUBSTITUTE*****

## (undated) DEVICE — SYSTEM CONTAINED EXTRACTION W/BALLOON BLUNT TIP TROCAR 17CM (1/EA)

## (undated) DEVICE — WIRE GUIDE .038 X 150 FLEX - .

## (undated) DEVICE — TRAY FOLEY CATHETER STATLOCK 16FR SURESTEP (10EA/CA)

## (undated) DEVICE — SET FLUID WARMING STANDARD FLOW - (10/CA)

## (undated) DEVICE — PAD OR TABLE DA VINCI 2IN X 20IN X 72IN - (12EA/CA)